# Patient Record
Sex: FEMALE | Race: WHITE | NOT HISPANIC OR LATINO | Employment: UNEMPLOYED | URBAN - METROPOLITAN AREA
[De-identification: names, ages, dates, MRNs, and addresses within clinical notes are randomized per-mention and may not be internally consistent; named-entity substitution may affect disease eponyms.]

---

## 2019-06-18 ENCOUNTER — OFFICE VISIT (OUTPATIENT)
Dept: NEUROSURGERY | Facility: CLINIC | Age: 64
End: 2019-06-18

## 2019-06-18 ENCOUNTER — PREP FOR SURGERY (OUTPATIENT)
Dept: OTHER | Facility: HOSPITAL | Age: 64
End: 2019-06-18

## 2019-06-18 VITALS
BODY MASS INDEX: 30.83 KG/M2 | SYSTOLIC BLOOD PRESSURE: 162 MMHG | TEMPERATURE: 98.7 F | HEIGHT: 63 IN | WEIGHT: 174 LBS | DIASTOLIC BLOOD PRESSURE: 102 MMHG

## 2019-06-18 DIAGNOSIS — D49.6 BRAIN TUMOR (HCC): Primary | ICD-10-CM

## 2019-06-18 DIAGNOSIS — Z85.038 HISTORY OF COLON CANCER: ICD-10-CM

## 2019-06-18 PROBLEM — D32.9 MENINGIOMA (HCC): Status: ACTIVE | Noted: 2019-06-18

## 2019-06-18 PROCEDURE — 99203 OFFICE O/P NEW LOW 30 MIN: CPT | Performed by: NEUROLOGICAL SURGERY

## 2019-06-18 RX ORDER — SODIUM CHLORIDE, SODIUM LACTATE, POTASSIUM CHLORIDE, CALCIUM CHLORIDE 600; 310; 30; 20 MG/100ML; MG/100ML; MG/100ML; MG/100ML
100 INJECTION, SOLUTION INTRAVENOUS CONTINUOUS
Status: CANCELLED | OUTPATIENT
Start: 2019-06-18

## 2019-06-18 RX ORDER — DEXAMETHASONE 4 MG/1
4 TABLET ORAL 2 TIMES DAILY WITH MEALS
Qty: 60 TABLET | Refills: 0 | Status: SHIPPED | OUTPATIENT
Start: 2019-06-18 | End: 2019-07-22

## 2019-06-18 RX ORDER — CEFAZOLIN SODIUM 2 G/100ML
2 INJECTION, SOLUTION INTRAVENOUS ONCE
Status: CANCELLED | OUTPATIENT
Start: 2019-06-18 | End: 2019-06-18

## 2019-06-18 RX ORDER — SODIUM CHLORIDE 0.9 % (FLUSH) 0.9 %
3 SYRINGE (ML) INJECTION EVERY 12 HOURS SCHEDULED
Status: CANCELLED | OUTPATIENT
Start: 2019-06-18

## 2019-06-18 RX ORDER — SODIUM CHLORIDE 0.9 % (FLUSH) 0.9 %
3-10 SYRINGE (ML) INJECTION AS NEEDED
Status: CANCELLED | OUTPATIENT
Start: 2019-06-18

## 2019-06-18 NOTE — PATIENT INSTRUCTIONS
If you need:     Call Dr. Birch on a Monday or Tuesday.      Ask for Rosalba () and leave a message for  Dr. Birch.   He will call you back at the end of the day as soon as he can.     [phone]

## 2019-06-18 NOTE — H&P
Chief Complaint   Patient presents with   • Headache         HISTORY OF PRESENT ILLNESS: This is a 64-year-old female who is referred with a chief complaint of headache.  In February 2019 she was diagnosed and underwent surgical intervention for colon cancer.  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  This appears to be dural based minimal edema.  There is no hydrocephalus or shift of the fourth ventricle.     Her headache is somewhat ill-defined and not that usually associated with increased intracranial pressure.  She attributes this to a high school athletic injury in which she was involved.  Recent symptoms of injury, however.  She has no symptoms of dysmetria or ataxia.  She has no other issues of neurological dysfunction although she has noted that she has issues with her memory more recently subsequent to the stress under which she is living.     Medical History        Past Medical History:   Diagnosis Date   • Anemia     • Anemia     • Arthritis     • Bronchitis     • Cancer (CMS/HCC)       colon   • H/O bladder infections     • History of transfusion     • Low back pain     • Maternal toxemia, unspecified trimester     • Pneumonia     • Transfusion history              Surgical History         Past Surgical History:   Procedure Laterality Date   • CHOLECYSTECTOMY       • COLON RESECTION                      Family History   Problem Relation Age of Onset   • Heart disease Mother     • Heart disease Father     • Cancer Sister           Social History   Social History            Socioeconomic History   • Marital status:        Spouse name: Not on file   • Number of children: Not on file   • Years of education: Not on file   • Highest education level: Not on file   Tobacco Use   • Smoking status: Never Smoker   • Smokeless tobacco: Never Used   Substance and Sexual Activity   •  Alcohol use: Yes   • Drug use: No   • Sexual activity: Defer            No Known Allergies     No current outpatient medications on file.     Review of Systems   Constitutional: Positive for activity change and fatigue. Negative for appetite change, chills, diaphoresis, fever and unexpected weight change.   HENT: Positive for congestion, ear pain and tinnitus. Negative for dental problem, drooling, ear discharge, facial swelling, hearing loss, mouth sores, nosebleeds, postnasal drip, rhinorrhea, sinus pressure, sneezing, sore throat, trouble swallowing and voice change.    Eyes: Positive for pain and visual disturbance. Negative for photophobia, discharge, redness and itching.   Respiratory: Negative for apnea, cough, choking, chest tightness, shortness of breath, wheezing and stridor.    Cardiovascular: Negative for chest pain, palpitations and leg swelling.   Gastrointestinal: Positive for constipation. Negative for abdominal distention, abdominal pain, anal bleeding, blood in stool, diarrhea, nausea, rectal pain and vomiting.   Endocrine: Negative for cold intolerance, heat intolerance, polydipsia, polyphagia and polyuria.   Genitourinary: Negative for decreased urine volume, difficulty urinating, dysuria, enuresis, flank pain, frequency, genital sores, hematuria and urgency.   Musculoskeletal: Positive for arthralgias, back pain and neck pain. Negative for gait problem, joint swelling, myalgias and neck stiffness.   Skin: Negative for color change, pallor, rash and wound.   Allergic/Immunologic: Positive for environmental allergies. Negative for food allergies and immunocompromised state.   Neurological: Positive for headaches. Negative for dizziness, tremors, seizures, syncope, facial asymmetry, speech difficulty, weakness, light-headedness and numbness.   Hematological: Negative for adenopathy. Does not bruise/bleed easily.   Psychiatric/Behavioral: Negative for agitation, behavioral problems, confusion,  "decreased concentration, dysphoric mood, hallucinations, self-injury, sleep disturbance and suicidal ideas. The patient is not nervous/anxious and is not hyperactive.          Vitals       Vitals:     06/18/19 0850   BP: (!) 162/102   BP Location: Left arm   Patient Position: Sitting   Cuff Size: Adult   Temp: 98.7 °F (37.1 °C)   TempSrc: Temporal   Weight: 78.9 kg (174 lb)   Height: 160 cm (63\")            Neurological Examination:     Mental status/speech: The patient is alert and oriented.  Speech is clear without aphysia or dysarthria.  No overt cognitive deficits.     Cranial nerve examination:     Olfaction: Smell is intact.  Vision: Vision is intact without visual field abnormalities.  Funduscopic examination is normal.  No pupillary irregularity.  Ocular motor examination: The extraocular muscles are intact.  There is no diplopia.  The pupil is round and reactive to both light and accommodation.  There is no nystagmus.  Facial movement/sensation: There is no facial weakness.  Sensation is intact in the first, second, and third divisions of the trigeminal nerve.  The corneal reflex is intact.  Auditory: Hearing is intact to finger rub bilaterally.  Cranial nerves IX, X, XI, XII: Phonation is normal.  No dysphagia.  Tongue is protruded in the midline without atrophy.  The gag reflex is intact.  Shoulder shrug is normal.     Musculoligamentous ligamentous examination: No weakness, sensory loss or reflex asymmetry.  No ataxia or dysmetria.  Gait normal.  No Babinski Macario or clonus     Medical Decision Making:                 Diagnostic Data Set: The MRI shows a dural based tumor in the posterior fossa in the mid right cerebellar hemisphere.  This appears to be adjacent to if not attached to the lateral sinus.                             Assessment: Cerebellar neoplasm                                                                            Recommendations: It is impossible to determine whether this is a " benign lesion such as a meningioma or indeed a metastatic disease.  In any case I have recommended removal for histopathological confirmation as well as extraction.  I reviewed all the risks and benefits.  We will do cerebral angiography prior to her surgical intervention for cessation as well as determination of the patency of the lateral sinus.  Craniotomy will be Stealth guided as well.

## 2019-06-23 ENCOUNTER — HOSPITAL ENCOUNTER (OUTPATIENT)
Dept: GENERAL RADIOLOGY | Facility: HOSPITAL | Age: 64
Discharge: HOME | End: 2019-06-23
Admitting: ANESTHESIOLOGY

## 2019-06-23 ENCOUNTER — APPOINTMENT (OUTPATIENT)
Dept: PREADMISSION TESTING | Facility: HOSPITAL | Age: 64
End: 2019-06-23

## 2019-06-23 VITALS — HEIGHT: 63 IN | BODY MASS INDEX: 30.31 KG/M2 | WEIGHT: 171.08 LBS

## 2019-06-23 DIAGNOSIS — D49.6 BRAIN TUMOR (HCC): ICD-10-CM

## 2019-06-23 LAB
ABO GROUP BLD: NORMAL
ANION GAP SERPL CALCULATED.3IONS-SCNC: 16 MMOL/L
BLD GP AB SCN SERPL QL: NEGATIVE
BUN BLD-MCNC: 20 MG/DL (ref 8–23)
BUN/CREAT SERPL: 34.5 (ref 7–25)
CALCIUM SPEC-SCNC: 9.9 MG/DL (ref 8.6–10.5)
CHLORIDE SERPL-SCNC: 102 MMOL/L (ref 98–107)
CO2 SERPL-SCNC: 23 MMOL/L (ref 22–29)
CREAT BLD-MCNC: 0.58 MG/DL (ref 0.57–1)
DEPRECATED RDW RBC AUTO: 41.9 FL (ref 37–54)
ERYTHROCYTE [DISTWIDTH] IN BLOOD BY AUTOMATED COUNT: 14.2 % (ref 12.3–15.4)
GFR SERPL CREATININE-BSD FRML MDRD: 105 ML/MIN/1.73
GLUCOSE BLD-MCNC: 122 MG/DL (ref 65–99)
HBA1C MFR BLD: 5 % (ref 4.8–5.6)
HCT VFR BLD AUTO: 49.3 % (ref 34–46.6)
HGB BLD-MCNC: 16 G/DL (ref 12–15.9)
MCH RBC QN AUTO: 28.1 PG (ref 26.6–33)
MCHC RBC AUTO-ENTMCNC: 32.5 G/DL (ref 31.5–35.7)
MCV RBC AUTO: 86.5 FL (ref 79–97)
MRSA DNA SPEC QL NAA+PROBE: NEGATIVE
PLATELET # BLD AUTO: 282 10*3/MM3 (ref 140–450)
PMV BLD AUTO: 9.8 FL (ref 6–12)
POTASSIUM BLD-SCNC: 4.5 MMOL/L (ref 3.5–5.2)
RBC # BLD AUTO: 5.7 10*6/MM3 (ref 3.77–5.28)
RH BLD: NEGATIVE
SODIUM BLD-SCNC: 141 MMOL/L (ref 136–145)
T&S EXPIRATION DATE: NORMAL
WBC NRBC COR # BLD: 7.02 10*3/MM3 (ref 3.4–10.8)

## 2019-06-23 PROCEDURE — 93005 ELECTROCARDIOGRAM TRACING: CPT

## 2019-06-23 PROCEDURE — 86850 RBC ANTIBODY SCREEN: CPT | Performed by: NEUROLOGICAL SURGERY

## 2019-06-23 PROCEDURE — 86900 BLOOD TYPING SEROLOGIC ABO: CPT | Performed by: NEUROLOGICAL SURGERY

## 2019-06-23 PROCEDURE — 71046 X-RAY EXAM CHEST 2 VIEWS: CPT

## 2019-06-23 PROCEDURE — 83036 HEMOGLOBIN GLYCOSYLATED A1C: CPT | Performed by: NEUROLOGICAL SURGERY

## 2019-06-23 PROCEDURE — 87641 MR-STAPH DNA AMP PROBE: CPT | Performed by: ANESTHESIOLOGY

## 2019-06-23 PROCEDURE — 36415 COLL VENOUS BLD VENIPUNCTURE: CPT

## 2019-06-23 PROCEDURE — 86901 BLOOD TYPING SEROLOGIC RH(D): CPT | Performed by: NEUROLOGICAL SURGERY

## 2019-06-23 PROCEDURE — 93010 ELECTROCARDIOGRAM REPORT: CPT | Performed by: INTERNAL MEDICINE

## 2019-06-23 PROCEDURE — 85027 COMPLETE CBC AUTOMATED: CPT | Performed by: NEUROLOGICAL SURGERY

## 2019-06-23 PROCEDURE — 80048 BASIC METABOLIC PNL TOTAL CA: CPT | Performed by: NEUROLOGICAL SURGERY

## 2019-06-25 ENCOUNTER — HOSPITAL ENCOUNTER (INPATIENT)
Facility: HOSPITAL | Age: 64
LOS: 1 days | Discharge: HOME | End: 2019-06-26
Attending: RADIOLOGY | Admitting: RADIOLOGY

## 2019-06-25 DIAGNOSIS — D49.6 BRAIN TUMOR (HCC): ICD-10-CM

## 2019-06-25 DIAGNOSIS — Z85.038 HISTORY OF COLON CANCER: ICD-10-CM

## 2019-06-25 PROBLEM — I77.71 INTERNAL CAROTID ARTERY DISSECTION (HCC): Status: ACTIVE | Noted: 2019-06-25

## 2019-06-25 PROCEDURE — C1894 INTRO/SHEATH, NON-LASER: HCPCS | Performed by: RADIOLOGY

## 2019-06-25 PROCEDURE — 36224 PLACE CATH CAROTD ART: CPT | Performed by: RADIOLOGY

## 2019-06-25 PROCEDURE — C1760 CLOSURE DEV, VASC: HCPCS | Performed by: RADIOLOGY

## 2019-06-25 PROCEDURE — B319YZZ FLUOROSCOPY OF RIGHT EXTERNAL CAROTID ARTERY USING OTHER CONTRAST: ICD-10-PCS | Performed by: RADIOLOGY

## 2019-06-25 PROCEDURE — C1769 GUIDE WIRE: HCPCS | Performed by: RADIOLOGY

## 2019-06-25 PROCEDURE — C1887 CATHETER, GUIDING: HCPCS | Performed by: RADIOLOGY

## 2019-06-25 PROCEDURE — B316YZZ FLUOROSCOPY OF RIGHT INTERNAL CAROTID ARTERY USING OTHER CONTRAST: ICD-10-PCS | Performed by: RADIOLOGY

## 2019-06-25 PROCEDURE — 25010000002 EPTIFIBATIDE 20 MG/10ML SOLUTION: Performed by: RADIOLOGY

## 2019-06-25 PROCEDURE — 25010000002 HEPARIN (PORCINE) PER 1000 UNITS: Performed by: RADIOLOGY

## 2019-06-25 PROCEDURE — 36227 PLACE CATH XTRNL CAROTID: CPT | Performed by: RADIOLOGY

## 2019-06-25 PROCEDURE — C1876 STENT, NON-COA/NON-COV W/DEL: HCPCS | Performed by: RADIOLOGY

## 2019-06-25 PROCEDURE — 25010000002 MIDAZOLAM PER 1 MG: Performed by: RADIOLOGY

## 2019-06-25 PROCEDURE — 3E033PZ INTRODUCTION OF PLATELET INHIBITOR INTO PERIPHERAL VEIN, PERCUTANEOUS APPROACH: ICD-10-PCS | Performed by: RADIOLOGY

## 2019-06-25 PROCEDURE — 63710000001 DEXAMETHASONE PER 0.25 MG: Performed by: RADIOLOGY

## 2019-06-25 PROCEDURE — 0 IODIXANOL PER 1 ML: Performed by: RADIOLOGY

## 2019-06-25 PROCEDURE — 25010000002 FENTANYL CITRATE (PF) 100 MCG/2ML SOLUTION: Performed by: RADIOLOGY

## 2019-06-25 RX ORDER — DEXAMETHASONE 4 MG/1
4 TABLET ORAL 2 TIMES DAILY WITH MEALS
Status: DISCONTINUED | OUTPATIENT
Start: 2019-06-25 | End: 2019-06-26 | Stop reason: HOSPADM

## 2019-06-25 RX ORDER — EPTIFIBATIDE 2 MG/ML
INJECTION, SOLUTION INTRAVENOUS AS NEEDED
Status: DISCONTINUED | OUTPATIENT
Start: 2019-06-25 | End: 2019-06-25 | Stop reason: HOSPADM

## 2019-06-25 RX ORDER — ASPIRIN 81 MG/1
81 TABLET, CHEWABLE ORAL DAILY
Status: DISCONTINUED | OUTPATIENT
Start: 2019-06-26 | End: 2019-06-26 | Stop reason: HOSPADM

## 2019-06-25 RX ORDER — MIDAZOLAM HYDROCHLORIDE 1 MG/ML
INJECTION INTRAMUSCULAR; INTRAVENOUS AS NEEDED
Status: DISCONTINUED | OUTPATIENT
Start: 2019-06-25 | End: 2019-06-25 | Stop reason: HOSPADM

## 2019-06-25 RX ORDER — SODIUM CHLORIDE 9 MG/ML
75 INJECTION, SOLUTION INTRAVENOUS CONTINUOUS
Status: ACTIVE | OUTPATIENT
Start: 2019-06-25 | End: 2019-06-25

## 2019-06-25 RX ORDER — IODIXANOL 320 MG/ML
INJECTION, SOLUTION INTRAVASCULAR AS NEEDED
Status: DISCONTINUED | OUTPATIENT
Start: 2019-06-25 | End: 2019-06-25 | Stop reason: HOSPADM

## 2019-06-25 RX ORDER — SODIUM CHLORIDE 0.9 % (FLUSH) 0.9 %
3 SYRINGE (ML) INJECTION EVERY 12 HOURS SCHEDULED
Status: DISCONTINUED | OUTPATIENT
Start: 2019-06-25 | End: 2019-06-26 | Stop reason: HOSPADM

## 2019-06-25 RX ORDER — LIDOCAINE HYDROCHLORIDE 10 MG/ML
INJECTION, SOLUTION EPIDURAL; INFILTRATION; INTRACAUDAL; PERINEURAL AS NEEDED
Status: DISCONTINUED | OUTPATIENT
Start: 2019-06-25 | End: 2019-06-25 | Stop reason: HOSPADM

## 2019-06-25 RX ORDER — SODIUM CHLORIDE 0.9 % (FLUSH) 0.9 %
3-10 SYRINGE (ML) INJECTION AS NEEDED
Status: DISCONTINUED | OUTPATIENT
Start: 2019-06-25 | End: 2019-06-26 | Stop reason: HOSPADM

## 2019-06-25 RX ORDER — ASPIRIN 81 MG/1
TABLET, CHEWABLE ORAL AS NEEDED
Status: DISCONTINUED | OUTPATIENT
Start: 2019-06-25 | End: 2019-06-25 | Stop reason: HOSPADM

## 2019-06-25 RX ORDER — HEPARIN SODIUM 1000 [USP'U]/ML
INJECTION, SOLUTION INTRAVENOUS; SUBCUTANEOUS AS NEEDED
Status: DISCONTINUED | OUTPATIENT
Start: 2019-06-25 | End: 2019-06-25 | Stop reason: HOSPADM

## 2019-06-25 RX ORDER — FENTANYL CITRATE 50 UG/ML
INJECTION, SOLUTION INTRAMUSCULAR; INTRAVENOUS AS NEEDED
Status: DISCONTINUED | OUTPATIENT
Start: 2019-06-25 | End: 2019-06-25 | Stop reason: HOSPADM

## 2019-06-25 RX ADMIN — SODIUM CHLORIDE 75 ML/HR: 9 INJECTION, SOLUTION INTRAVENOUS at 15:47

## 2019-06-25 RX ADMIN — SODIUM CHLORIDE 75 ML/HR: 9 INJECTION, SOLUTION INTRAVENOUS at 07:37

## 2019-06-25 RX ADMIN — SODIUM CHLORIDE, PRESERVATIVE FREE 3 ML: 5 INJECTION INTRAVENOUS at 20:51

## 2019-06-25 RX ADMIN — SODIUM CHLORIDE, PRESERVATIVE FREE 3 ML: 5 INJECTION INTRAVENOUS at 15:47

## 2019-06-25 RX ADMIN — DEXAMETHASONE 4 MG: 4 TABLET ORAL at 18:09

## 2019-06-26 ENCOUNTER — TELEPHONE (OUTPATIENT)
Dept: NEUROSURGERY | Facility: CLINIC | Age: 64
End: 2019-06-26

## 2019-06-26 ENCOUNTER — APPOINTMENT (OUTPATIENT)
Dept: CT IMAGING | Facility: HOSPITAL | Age: 64
End: 2019-06-26

## 2019-06-26 VITALS
HEIGHT: 63 IN | RESPIRATION RATE: 18 BRPM | WEIGHT: 175.93 LBS | TEMPERATURE: 98 F | OXYGEN SATURATION: 98 % | DIASTOLIC BLOOD PRESSURE: 64 MMHG | HEART RATE: 59 BPM | SYSTOLIC BLOOD PRESSURE: 135 MMHG | BODY MASS INDEX: 31.17 KG/M2

## 2019-06-26 DIAGNOSIS — I77.71 DISSECTION OF CAROTID ARTERY (HCC): Primary | ICD-10-CM

## 2019-06-26 PROCEDURE — 63710000001 DEXAMETHASONE PER 0.25 MG: Performed by: RADIOLOGY

## 2019-06-26 PROCEDURE — 0 IOPAMIDOL PER 1 ML: Performed by: NEUROLOGICAL SURGERY

## 2019-06-26 PROCEDURE — 70498 CT ANGIOGRAPHY NECK: CPT

## 2019-06-26 RX ORDER — METHYLPREDNISOLONE 4 MG/1
TABLET ORAL
Qty: 21 TABLET | Refills: 0 | Status: SHIPPED | OUTPATIENT
Start: 2019-06-26 | End: 2019-07-22

## 2019-06-26 RX ORDER — ASPIRIN 81 MG/1
81 TABLET, CHEWABLE ORAL DAILY
Qty: 30 TABLET | Refills: 5 | Status: SHIPPED | OUTPATIENT
Start: 2019-06-27 | End: 2019-06-27

## 2019-06-26 RX ADMIN — DEXAMETHASONE 4 MG: 4 TABLET ORAL at 08:24

## 2019-06-26 RX ADMIN — TICAGRELOR 90 MG: 90 TABLET ORAL at 08:24

## 2019-06-26 RX ADMIN — IOPAMIDOL 75 ML: 755 INJECTION, SOLUTION INTRAVENOUS at 05:37

## 2019-06-26 RX ADMIN — ASPIRIN 81 MG CHEWABLE TABLET 81 MG: 81 TABLET CHEWABLE at 08:24

## 2019-06-27 ENCOUNTER — READMISSION MANAGEMENT (OUTPATIENT)
Dept: CALL CENTER | Facility: HOSPITAL | Age: 64
End: 2019-06-27

## 2019-06-27 RX ORDER — ASPIRIN 81 MG/1
81 TABLET, CHEWABLE ORAL DAILY
Qty: 30 TABLET | Refills: 5 | Status: SHIPPED | OUTPATIENT
Start: 2019-06-27 | End: 2019-11-19

## 2019-06-27 NOTE — OUTREACH NOTE
Prep Survey      Responses   Facility patient discharged from?  [place]   Is patient eligible?  Yes   Discharge diagnosis  Brain tumor  Right internal carotid dissection, iatrogenic   tumor resection will have to be postponed   Does the patient have one of the following disease processes/diagnoses(primary or secondary)?  Other   Does the patient have Home health ordered?  No   Is there a DME ordered?  No   Prep survey completed?  Yes          Mara Rios RN

## 2019-06-28 ENCOUNTER — READMISSION MANAGEMENT (OUTPATIENT)
Dept: CALL CENTER | Facility: HOSPITAL | Age: 64
End: 2019-06-28

## 2019-06-28 NOTE — OUTREACH NOTE
Medical Week 1 Survey      Responses   Facility patient discharged from?  [facility]   Does the patient have one of the following disease processes/diagnoses(primary or secondary)?  Other   Is there a successful TCM telephone encounter documented?  No   Week 1 attempt successful?  Yes   Call start time  1441   Call end time  1449   Discharge diagnosis  Brain tumor  Right internal carotid dissection, iatrogenic   tumor resection will have to be postponed   Meds reviewed with patient/caregiver?  Yes   Is the patient having any side effects they believe may be caused by any medication additions or changes?  No   Does the patient have all medications ordered at discharge?  Yes   Is the patient taking all medications as directed (includes completed medication regime)?  Yes   Does the patient have a primary care provider?   Yes   Does the patient have an appointment with their PCP within 7 days of discharge?  Greater than 7 days   What is preventing the patient from scheduling follow up appointments within 7 days of discharge?  Earlier appointment not available   Nursing Interventions  Verified appointment date/time/provider   Has the patient kept scheduled appointments due by today?  N/A   Has home health visited the patient within 72 hours of discharge?  N/A   Psychosocial issues?  No   Did the patient receive a copy of their discharge instructions?  Yes   Nursing interventions  Reviewed instructions with patient   What is the patient's perception of their health status since discharge?  Improving   Is the patient/caregiver able to teach back signs and symptoms related to disease process for when to call PCP?  Yes   Is the patient/caregiver able to teach back signs and symptoms related to disease process for when to call 911?  Yes   Is the patient/caregiver able to teach back the hierarchy of who to call/visit for symptoms/problems? PCP, Specialist, Home health nurse, Urgent Care, ED, 911  Yes   Additional teach back  comments  She is doing well, incision is healing, she has no issues or questions at this time.  Encouraged NIH.gov for information.   Week 1 call completed?  Yes          Dorina Salas RN

## 2019-07-02 ENCOUNTER — TELEPHONE (OUTPATIENT)
Dept: NEUROSURGERY | Facility: CLINIC | Age: 64
End: 2019-07-02

## 2019-07-02 NOTE — TELEPHONE ENCOUNTER
Please tell her to observe.  If she develops facial droop, unilateral weakness, difficulty talking, visual compromise etc. Those are reasons to present to the ED

## 2019-07-02 NOTE — TELEPHONE ENCOUNTER
Patient just called at 4:04 and she said she went into the kitchen and suddenly developed a head/ache in the occipital region.  She states that it lasted 20-30 seconds, and her head felt hot. She currently has a ice pack on it.  She said that it just feels funny now.

## 2019-07-02 NOTE — TELEPHONE ENCOUNTER
When do we need to start this? Pt has enough Brilinta to do her until her appt on the 22nd    Also- when transitioning from Brilinta to Plavix. Does pt need to take regular dose of Brilinta, plus 300 of Plavix the same day, or 300mg of Plavix only?

## 2019-07-02 NOTE — TELEPHONE ENCOUNTER
I spoke with Dr. Weinstein.   - please have her switch to Plavix 75mg 1x daily.     The day that she transitions from Brilinta to Plavix, she needs to take 300mg of plavix as a loading dose.   - each day after, she only needs 75mg

## 2019-07-02 NOTE — TELEPHONE ENCOUNTER
Provider:  Eyal/Benjy  Caller: patient  Time of call:  2:05   Phone #:  [phone]  Surgery:  No-Right internal Carotid dissection - brain tumor  Surgery Date:  TBD  Last visit:   same  Next visit: 07/22/19    LUZ MARIA:         Reason for call:     Patient was given Brilinta 90 mg in the hospital.  She was given a 30 day supply.      Her insurance will not pay for any refills on this medication.  I did a prior authorization for Brilinta and [employer] has denied this.      Can we change it to something else?    Patient aware that it may be tomorrow before we get back with her.

## 2019-07-02 NOTE — TELEPHONE ENCOUNTER
Patient notified and verbalized understanding. She denies facial drooping, visual disturbances or difficultly with her speech. (She has a little ache behind her right eye.) She states that she is feeling a bit better since she used the ice pack. She had her daughter come by to sit with her.     We discussed her Brilinta, and she is aware that the Insurance won't pay after the first initial 30 days. I told her that I would call in Plavix perCallie MCGHEE.  She's aware that the first dose will be a loading dose.    Medication phone in.  Patient aware.

## 2019-07-05 ENCOUNTER — TELEPHONE (OUTPATIENT)
Dept: NEUROSURGERY | Facility: CLINIC | Age: 64
End: 2019-07-05

## 2019-07-05 RX ORDER — OMEPRAZOLE 40 MG/1
40 CAPSULE, DELAYED RELEASE ORAL DAILY
Qty: 30 CAPSULE | Refills: 6 | Status: SHIPPED | OUTPATIENT
Start: 2019-07-05 | End: 2019-07-22

## 2019-07-05 NOTE — TELEPHONE ENCOUNTER
"Provider:  Eyal  Caller: Ramila  Time of call: 9:21am    Phone #:  [phone]  Surgery:  Crani  Surgery Date: TBD   Last visit:   6/18/19  Next visit: 7/22/19    Reason for call:       Pt left a message stating she is having numbness in both legs and feet and she believes this is from the angiogram. She states she feels unsteady on her feet and is having \"heart issues\". She is taking calcium magnesium and potassium. She tells me the Brilinta is is making her food taste odd.     She is wanting to remove the \"plug\" from her groin?       Please call pt back to discuss.        "

## 2019-07-08 ENCOUNTER — TELEPHONE (OUTPATIENT)
Dept: NEUROSURGERY | Facility: CLINIC | Age: 64
End: 2019-07-08

## 2019-07-08 ENCOUNTER — READMISSION MANAGEMENT (OUTPATIENT)
Dept: CALL CENTER | Facility: HOSPITAL | Age: 64
End: 2019-07-08

## 2019-07-08 RX ORDER — CLOPIDOGREL BISULFATE 75 MG/1
75 TABLET ORAL DAILY
Qty: 30 TABLET | Refills: 2 | Status: SHIPPED | OUTPATIENT
Start: 2019-07-08 | End: 2019-07-22

## 2019-07-08 NOTE — TELEPHONE ENCOUNTER
Per Js in previous encounter.     The day that she transitions from Brilinta to Plavix, she needs to take 300mg of plavix as a loading dose.   - each day after, she only needs 75mg

## 2019-07-08 NOTE — OUTREACH NOTE
Medical Week 2 Survey      Responses   Facility patient discharged from?  [facility]   Does the patient have one of the following disease processes/diagnoses(primary or secondary)?  Other   Week 2 attempt successful?  Yes   Call start time  1252   Discharge diagnosis  Brain tumor  Right internal carotid dissection, iatrogenic   tumor resection will have to be postponed   Call end time  1306   Meds reviewed with patient/caregiver?  Yes   Does the patient have all medications ordered at discharge?  Yes   Is the patient taking all medications as directed (includes completed medication regime)?  No   What is preventing the patient from taking all medications as directed?  Side effects [She thinks the Brilinta was causing her brain to feel weak and her heart felt like she was gong to have a heart attack. Her heart now feels calm. 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 ]   Nursing Interventions  Nurse provided patient education, Advised patient to call provider [Advised not to stop medication without discussing with MD, especailly blood thinner, Brilinta.  She has left a voicemail with her neurosurgeon today regarding her stopping medications.]   Medication comments  Takes Cayene pepper for her heart. She has stopped the Brilinta, Saturday evening was the last dose she took.  She has also stopped the Omprazole, she only took 2 of those.   Does the patient have a primary care provider?   Yes   Has the patient kept scheduled appointments due by today?  N/A   Comments  July 22 with neurosurgeon   What is the patient's perception of their health status since discharge?  Improving   Week 2 Call Completed?  Yes          Saray Flores RN

## 2019-07-08 NOTE — TELEPHONE ENCOUNTER
Patient needs to be on DAPT for 6 weeks... She cannot come off of the DAPT. She has a carotid dissection and is at high risk for stroke...     Call patient ASAP and have her go get Plavix today.

## 2019-07-08 NOTE — TELEPHONE ENCOUNTER
I spoke with patient, after clarification from Kaushal MCGHEE and she will  her Plavix today and start it.  She knows to continue the ASA as well.  She was thankful for the call.

## 2019-07-08 NOTE — TELEPHONE ENCOUNTER
"Provider:  Eyal  Caller: Ramila  Time of call: 113    Phone #:  [phone]  Surgery:  Crani  Surgery Date: TBD   Last visit:   6/18/19  Next visit: 7/22/19       Reason for call:         FYI: pt states she has stopped taking Brillinta and as a results many of her issues have resolved or greatly improved. She is taking several \"natural\" remedies and having some success.   "

## 2019-07-10 NOTE — TELEPHONE ENCOUNTER
OK be sure she is taking ASA... Just let her know that if she doesn't take either she will be suboptimally treated for her carotid dissection

## 2019-07-10 NOTE — TELEPHONE ENCOUNTER
Pt called today letting us know she took her first dose of Plavix today, and all of her symptoms returned including gait unsteadiness, heart pain and right arm pain.     Pt states she will NOT resume taking Plavix, this is poisoning her body. Pt states she is going to let her body relax for a few days.

## 2019-07-10 NOTE — TELEPHONE ENCOUNTER
I explained the importance of being on DAP. Pt does not seem worried. She is willing to take ASA 81mg daily as long as it doesn't cause her any problems.

## 2019-07-22 ENCOUNTER — HOSPITAL ENCOUNTER (OUTPATIENT)
Dept: CT IMAGING | Facility: HOSPITAL | Age: 64
Discharge: HOME | End: 2019-07-22
Admitting: RADIOLOGY

## 2019-07-22 ENCOUNTER — OFFICE VISIT (OUTPATIENT)
Dept: NEUROSURGERY | Facility: CLINIC | Age: 64
End: 2019-07-22

## 2019-07-22 VITALS
BODY MASS INDEX: 31.01 KG/M2 | HEIGHT: 63 IN | DIASTOLIC BLOOD PRESSURE: 100 MMHG | WEIGHT: 175 LBS | SYSTOLIC BLOOD PRESSURE: 128 MMHG | TEMPERATURE: 99.2 F

## 2019-07-22 DIAGNOSIS — I77.71 DISSECTION OF CAROTID ARTERY (HCC): ICD-10-CM

## 2019-07-22 DIAGNOSIS — Z85.038 HISTORY OF COLON CANCER: ICD-10-CM

## 2019-07-22 DIAGNOSIS — D32.9 MENINGIOMA (HCC): Primary | ICD-10-CM

## 2019-07-22 LAB — CREAT BLDA-MCNC: 0.8 MG/DL (ref 0.6–1.3)

## 2019-07-22 PROCEDURE — 99213 OFFICE O/P EST LOW 20 MIN: CPT | Performed by: NEUROLOGICAL SURGERY

## 2019-07-22 PROCEDURE — 82565 ASSAY OF CREATININE: CPT

## 2019-07-22 PROCEDURE — 0 IOPAMIDOL PER 1 ML: Performed by: RADIOLOGY

## 2019-07-22 PROCEDURE — 70498 CT ANGIOGRAPHY NECK: CPT

## 2019-07-22 RX ADMIN — IOPAMIDOL 75 ML: 755 INJECTION, SOLUTION INTRAVENOUS at 13:08

## 2019-07-22 NOTE — PROGRESS NOTES
Ramila Roldan  1955  [id number]                        CHIEF COMPLAINT: Right cerebellar hemisphere meningioma         MEDICAL HISTORY SINCE LAST ENCOUNTER: This 64-year-old female reports for follow-up visit and preoperative visit.  She has been noted to have a meningioma in the right occipital lobe superiorly and at the level of the dura inferiorly.  She underwent angiography to determine dural sinus involvement as well as preoperative embolization.  During this procedure she had an iatrogenic dissection of the right cervical internal carotid artery without sequelae.  She reports today for follow-up visit that shows recannulation and no occlusion.    She has had issues with intermittent chest pain and arrhythmia.  This has not been evaluated.           Past Medical History:   Diagnosis Date   • Anemia    • Anemia    • Arthritis    • Cancer (CMS/HCC)     colon   • H/O bladder infections    • History of transfusion    • Low back pain    • Maternal toxemia, unspecified trimester    • Murmur    • Transfusion history               Past Surgical History:   Procedure Laterality Date   • CEREBRAL ANGIOGRAM      06/25/2019 PER DR. BURLESON.   • CHOLECYSTECTOMY     • COLON RESECTION     • COLONOSCOPY  2019   • ENDOSCOPY     • INTERVENTIONAL RADIOLOGY PROCEDURE Bilateral 6/25/2019    Procedure: Carotid Cerebral Angiogram;  Surgeon: Yanick Burleson MD;  Location: [facility] INVASIVE LOCATION;  Service: Interventional Radiology              Family History   Problem Relation Age of Onset   • Heart disease Mother    • Heart disease Father    • Cancer Sister               Social History     Socioeconomic History   • Marital status:      Spouse name: Not on file   • Number of children: Not on file   • Years of education: Not on file   • Highest education level: Not on file   Tobacco Use   • Smoking status: Never Smoker   • Smokeless tobacco: Never Used   Substance and Sexual Activity   • Alcohol use: Yes      Frequency: Never   • Drug use: No   • Sexual activity: Defer            No Known Allergies           Current Outpatient Medications:   •  aspirin 81 MG chewable tablet, Chew 1 tablet Daily., Disp: 30 tablet, Rfl: 5  •  B Complex-Biotin-FA (MULTI-B COMPLEX PO), Take 8 tablets by mouth 3 (Three) Times a Day., Disp: , Rfl:   No current facility-administered medications for this visit.          Review of Systems   Constitutional: Positive for unexpected weight change. Negative for activity change, appetite change, chills, diaphoresis, fatigue and fever.   HENT: Positive for dental problem and tinnitus. Negative for congestion, drooling, ear discharge, ear pain, facial swelling, hearing loss, mouth sores, nosebleeds, postnasal drip, rhinorrhea, sinus pressure, sinus pain, sneezing, sore throat, trouble swallowing and voice change.    Eyes: Positive for visual disturbance. Negative for photophobia, pain, discharge, redness and itching.   Respiratory: Positive for chest tightness (some). Negative for apnea, cough, choking, shortness of breath, wheezing and stridor.    Cardiovascular: Positive for chest pain (some). Negative for palpitations and leg swelling.   Gastrointestinal: Positive for constipation. Negative for abdominal distention, abdominal pain, anal bleeding, blood in stool, diarrhea, nausea, rectal pain and vomiting.   Endocrine: Positive for cold intolerance (feet). Negative for heat intolerance, polydipsia, polyphagia and polyuria.   Genitourinary: Negative for decreased urine volume, difficulty urinating, dyspareunia, dysuria, enuresis, flank pain, frequency, genital sores, hematuria, menstrual problem, pelvic pain, urgency, vaginal bleeding, vaginal discharge and vaginal pain.   Musculoskeletal: Positive for arthralgias, back pain, myalgias, neck pain and neck stiffness. Negative for gait problem and joint swelling.   Skin: Negative for color change, pallor, rash and wound.   Allergic/Immunologic: Positive  "for environmental allergies and food allergies. Negative for immunocompromised state.   Neurological: Positive for numbness (feet) and headaches. Negative for dizziness, tremors, seizures, syncope, facial asymmetry, speech difficulty, weakness and light-headedness.   Hematological: Negative for adenopathy. Does not bruise/bleed easily.   Psychiatric/Behavioral: Negative for agitation, behavioral problems, confusion, decreased concentration, dysphoric mood, hallucinations, self-injury, sleep disturbance and suicidal ideas. The patient is not nervous/anxious and is not hyperactive.                Vitals:    07/22/19 1515   Weight: 79.4 kg (175 lb)   Height: 160 cm (63\")               EXAMINATION: Neurologic examination normal.  No evidence of ataxia, apraxia or dysarthria.  No weakness sensory loss or reflex asymmetry.            MEDICAL DECISION MAKING: We plan to push forward with excision of the meningioma.  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           ASSESSMENT/DISPOSITION: She will need to have cardiac clearance prior to Stealth guided suboccipital craniectomy and excision of this lesion.  We will schedule and proceed accordingly if all is clear.              I APPRECIATE THE OPPORTUNITY OF THIS REFERRAL. PLEASE CALL IF ANY       QUESTIONS [phone]    Scribed for Angel Birch MD by John Pierce CMA. 7/22/2019 3:34 PM     I have read and concur with the information provided by the scribe.  Angel Birch MD    "

## 2019-07-22 NOTE — PATIENT INSTRUCTIONS
Call Dr. Birch on a Monday or Tuesday with an update.      Ask for Rosalba () and leave a message for  Dr. Birch.   He will call you back at the end of the day as soon as he can.     [phone]

## 2019-07-25 ENCOUNTER — TELEPHONE (OUTPATIENT)
Dept: NEUROSURGERY | Facility: CLINIC | Age: 64
End: 2019-07-25

## 2019-07-25 PROBLEM — Z01.810 ENCOUNTER FOR PRE-OPERATIVE CARDIOVASCULAR CLEARANCE: Status: ACTIVE | Noted: 2019-07-25

## 2019-07-25 NOTE — PROGRESS NOTES
Encounter Date:07/26/2019    Patient ID: Ramila Roldan is a 64 y.o. female who resides in [place], [place].    CC/Reason for visit:  Chest Pain           Problem List Items Addressed This Visit     Encounter for pre-operative cardiovascular clearance - Primary    Current Assessment & Plan     · The patient has no symptoms concerning for decompensated heart failure or acute coronary syndrome.  Her chest pain symptoms, in fact, are very atypical and not consistent with obstructive coronary artery disease which could increase her intraoperative cardiac risk.  She has adequate exercise tolerance her physical activities are adequate to replicate stress of surgery and I do not feel any further cardiac testing is necessary  · Proceed with craniotomy with acceptable cardiovascular risk.         Relevant Orders    ECG 12 Lead               · Proceed with craniotomy with acceptable cardiovascular risk          Ramila Roldan is referred to my office by Dr. Angel Birch for preoperative cardiac risk assessment.  The patient is a 64-year-old female with no cardiac history but long-standing complaints of chest pain symptoms.  These pains occur when she is exposed to WiFi networks.  This has been occurring since 2004.  The pain is relieved when she avoids areas with WiFi or blankets herself with Mylar.  She also gets relief of her chest pain symptoms with cayenne pepper.  Her chest pain symptoms are not exacerbated by exertion.  She reports that she recently unloaded and reloaded a trailer and put a tarp over the trailer without eliciting symptoms of chest discomfort.  She denies orthopnea or PND.  She does have back issues which does limit her from routine aerobic activity.  She denies any recent TIA or stroke symptoms.  The patient underwent general anesthesia with colon surgery in February 2019 and did not experience any complications.    The patient is scheduled to undergo craniotomy for a meningioma.  In preparation,  she underwent a carotid angiogram which was complicated by right internal carotid artery dissection.  This has remained stable on repeat imaging.  The patient had difficulty tolerating Brilinta or clopidogrel due to side effects (nonbleeding side effects) and is presently on aspirin monotherapy.      Review of Systems   Constitution: Positive for malaise/fatigue and weight gain. Negative for weakness.   HENT: Positive for tinnitus.    Eyes: Positive for blurred vision. Negative for vision loss in left eye and vision loss in right eye.   Cardiovascular: Positive for chest pain. Negative for dyspnea on exertion, near-syncope, orthopnea, palpitations, paroxysmal nocturnal dyspnea and syncope.   Endocrine: Positive for heat intolerance.   Musculoskeletal: Positive for arthritis, back pain, myalgias and neck pain.   Gastrointestinal: Positive for change in bowel habit and constipation.   Neurological: Positive for headaches. Negative for brief paralysis, excessive daytime sleepiness, focal weakness, numbness and paresthesias.   Allergic/Immunologic: Positive for environmental allergies.   All other systems reviewed and are negative.      The patient's past medical, social, family history and ROS reviewed in the patient's electronic medical record.    Allergies  Patient has no known allergies.    Outpatient Medications Marked as Taking for the 7/26/19 encounter (Consult) with Brian Pascual IV, MD   Medication Sig Dispense Refill   • aspirin 81 MG chewable tablet Chew 1 tablet Daily. 30 tablet 5   • AZO-CRANBERRY PO Take 1 tablet by mouth Daily.     • B Complex-Biotin-FA (MULTI-B COMPLEX PO) Take 8 tablets by mouth 3 (Three) Times a Day.     • calcium carbonate (OS-MANOHAR) 600 MG tablet Take 600 mg by mouth As Needed.     • COD LIVER OIL PO Take 2 mL by mouth 2 (Two) Times a Day.     • ferrous sulfate 324 (65 Fe) MG tablet delayed-release EC tablet Take 324 mg by mouth 2 (Two) Times a Day With Meals.     •  "melatonin 1 MG tablet Take  by mouth.     • Potassium 99 MG tablet Take 99 mg by mouth Daily.     • thiamine (VITAMIN B-1) 100 MG tablet Take 100 mg by mouth Daily.           Past Medical History:   Diagnosis Date   • Anemia    • Anemia    • Arthritis    • Cancer (CMS/HCC)     colon   • H/O bladder infections    • History of transfusion    • Low back pain    • Maternal toxemia, unspecified trimester    • Murmur    • Transfusion history      Past Surgical History:   Procedure Laterality Date   • CEREBRAL ANGIOGRAM      06/25/2019 PER DR. BURLESON.   • CHOLECYSTECTOMY     • COLON RESECTION     • COLONOSCOPY  2019   • ENDOSCOPY     • INTERVENTIONAL RADIOLOGY PROCEDURE Bilateral 6/25/2019    Procedure: Carotid Cerebral Angiogram;  Surgeon: Yanick Burleson MD;  Location: [facility] INVASIVE LOCATION;  Service: Interventional Radiology     Family History   Problem Relation Age of Onset   • Heart disease Mother    • Heart disease Father    • Cancer Sister      Social History     Tobacco Use   • Smoking status: Never Smoker   • Smokeless tobacco: Never Used   Substance Use Topics   • Alcohol use: Yes     Frequency: Never           Blood pressure 164/94, pulse 97, height 160 cm (63\"), weight 80.7 kg (178 lb).  Body mass index is 31.53 kg/m².  Vitals:    07/26/19 0942   Patient Position: Sitting       Physical Exam   Constitutional: She is oriented to person, place, and time. She appears well-developed and well-nourished.   HENT:   Head: Normocephalic and atraumatic.   Eyes: Pupils are equal, round, and reactive to light. No scleral icterus.   Neck: No JVD present. Carotid bruit is not present. No thyromegaly present.   Cardiovascular: Normal rate and regular rhythm. Exam reveals no gallop.   No murmur heard.  Pulmonary/Chest: Effort normal and breath sounds normal.   Abdominal: Soft. She exhibits no distension. There is no hepatosplenomegaly.   Musculoskeletal: She exhibits no edema.   Neurological: She is alert and oriented " to person, place, and time.   Skin: Skin is warm and dry.   Psychiatric: She has a normal mood and affect. Her behavior is normal.       Data Review:       ECG 12 Lead  Date/Time: 7/26/2019 9:56 AM  Performed by: Brian Pascual IV, MD  Authorized by: Brian Pascual IV, MD   Comparison: compared with previous ECG from 6/23/2019  Similar to previous ECG  Rhythm: sinus rhythm  BPM: 97  Other findings: poor R wave progression    Clinical impression: normal ECG  Comments: Poor R wave progression probable lead placement               Lab Results   Component Value Date    GLUCOSE 122 (H) 06/23/2019    CALCIUM 9.9 06/23/2019     06/23/2019    K 4.5 06/23/2019    CO2 23.0 06/23/2019     06/23/2019    BUN 20 06/23/2019    CREATININE 0.80 07/22/2019    EGFRIFNONA 105 06/23/2019    BCR 34.5 (H) 06/23/2019    ANIONGAP 16.0 06/23/2019     Lab Results   Component Value Date    WBC 7.02 06/23/2019    HGB 16.0 (H) 06/23/2019    HCT 49.3 (H) 06/23/2019    MCV 86.5 06/23/2019     06/23/2019     Lab Results   Component Value Date    HGBA1C 5.00 06/23/2019           HARRIS Pascual MD [facility], [facility]  Interventional and General Cardiology

## 2019-07-25 NOTE — TELEPHONE ENCOUNTER
----- Message from Rosalba Magana sent at 7/24/2019  4:20 PM EDT -----  Contact: [phone]  PATIENT CALLING TO LET YOU KNOW SHE HAS HAD PAIN IN HER KIDNEYS SINCE THE MRI, BUT SHE IS BETTER TODAY.

## 2019-07-26 ENCOUNTER — CONSULT (OUTPATIENT)
Dept: CARDIOLOGY | Facility: CLINIC | Age: 64
End: 2019-07-26

## 2019-07-26 ENCOUNTER — APPOINTMENT (OUTPATIENT)
Dept: CT IMAGING | Facility: HOSPITAL | Age: 64
End: 2019-07-26

## 2019-07-26 ENCOUNTER — TELEPHONE (OUTPATIENT)
Dept: CARDIOLOGY | Facility: CLINIC | Age: 64
End: 2019-07-26

## 2019-07-26 VITALS
WEIGHT: 178 LBS | DIASTOLIC BLOOD PRESSURE: 94 MMHG | HEART RATE: 97 BPM | SYSTOLIC BLOOD PRESSURE: 164 MMHG | BODY MASS INDEX: 31.54 KG/M2 | HEIGHT: 63 IN

## 2019-07-26 DIAGNOSIS — Z01.810 ENCOUNTER FOR PRE-OPERATIVE CARDIOVASCULAR CLEARANCE: Primary | ICD-10-CM

## 2019-07-26 DIAGNOSIS — D32.9 MENINGIOMA (HCC): Primary | ICD-10-CM

## 2019-07-26 DIAGNOSIS — I77.71 DISSECTION OF CAROTID ARTERY (HCC): ICD-10-CM

## 2019-07-26 PROBLEM — R07.89 ATYPICAL CHEST PAIN: Status: ACTIVE | Noted: 2019-07-26

## 2019-07-26 PROCEDURE — 93000 ELECTROCARDIOGRAM COMPLETE: CPT | Performed by: INTERNAL MEDICINE

## 2019-07-26 PROCEDURE — 99243 OFF/OP CNSLTJ NEW/EST LOW 30: CPT | Performed by: INTERNAL MEDICINE

## 2019-07-26 RX ORDER — THIAMINE MONONITRATE (VIT B1) 100 MG
100 TABLET ORAL DAILY
COMMUNITY
End: 2019-08-13

## 2019-07-26 RX ORDER — UREA 10 %
LOTION (ML) TOPICAL NIGHTLY PRN
COMMUNITY
End: 2019-11-19

## 2019-07-26 RX ORDER — FERROUS SULFATE TAB EC 324 MG (65 MG FE EQUIVALENT) 324 (65 FE) MG
324 TABLET DELAYED RESPONSE ORAL
COMMUNITY
End: 2019-08-16 | Stop reason: HOSPADM

## 2019-07-26 RX ORDER — PHENOL 1.4 %
600 AEROSOL, SPRAY (ML) MUCOUS MEMBRANE AS NEEDED
COMMUNITY
End: 2019-08-13

## 2019-07-26 NOTE — ASSESSMENT & PLAN NOTE
· The patient has no symptoms concerning for decompensated heart failure or acute coronary syndrome.  Her chest pain symptoms, in fact, are very atypical and not consistent with obstructive coronary artery disease which could increase her intraoperative cardiac risk.  She has adequate exercise tolerance her physical activities are adequate to replicate stress of surgery and I do not feel any further cardiac testing is necessary  · Proceed with craniotomy with acceptable cardiovascular risk.

## 2019-07-29 ENCOUNTER — PREP FOR SURGERY (OUTPATIENT)
Dept: OTHER | Facility: HOSPITAL | Age: 64
End: 2019-07-29

## 2019-07-29 ENCOUNTER — HOSPITAL ENCOUNTER (OUTPATIENT)
Dept: MRI IMAGING | Facility: HOSPITAL | Age: 64
Discharge: HOME | End: 2019-07-29

## 2019-07-29 DIAGNOSIS — Z85.038 HISTORY OF COLON CANCER: ICD-10-CM

## 2019-07-29 DIAGNOSIS — D32.9 MENINGIOMA (HCC): ICD-10-CM

## 2019-07-29 DIAGNOSIS — D32.9 MENINGIOMA (HCC): Primary | ICD-10-CM

## 2019-07-29 DIAGNOSIS — I77.71 DISSECTION OF CAROTID ARTERY (HCC): ICD-10-CM

## 2019-07-29 RX ORDER — CEFAZOLIN SODIUM 2 G/100ML
2 INJECTION, SOLUTION INTRAVENOUS ONCE
Status: CANCELLED | OUTPATIENT
Start: 2019-07-29 | End: 2019-07-29

## 2019-07-29 RX ORDER — SODIUM CHLORIDE 0.9 % (FLUSH) 0.9 %
3-10 SYRINGE (ML) INJECTION AS NEEDED
Status: CANCELLED | OUTPATIENT
Start: 2019-07-29

## 2019-07-29 RX ORDER — SODIUM CHLORIDE 0.9 % (FLUSH) 0.9 %
3 SYRINGE (ML) INJECTION EVERY 12 HOURS SCHEDULED
Status: CANCELLED | OUTPATIENT
Start: 2019-07-29

## 2019-07-29 RX ORDER — SODIUM CHLORIDE, SODIUM LACTATE, POTASSIUM CHLORIDE, CALCIUM CHLORIDE 600; 310; 30; 20 MG/100ML; MG/100ML; MG/100ML; MG/100ML
100 INJECTION, SOLUTION INTRAVENOUS CONTINUOUS
Status: CANCELLED | OUTPATIENT
Start: 2019-07-29

## 2019-07-29 NOTE — H&P
/18/1955  [id number]                          CHIEF COMPLAINT: Right cerebellar hemisphere meningioma          MEDICAL HISTORY SINCE LAST ENCOUNTER: This 64-year-old female reports for follow-up visit and preoperative visit.  She has been noted to have a meningioma in the right occipital lobe superiorly and at the level of the dura inferiorly.  She underwent angiography to determine dural sinus involvement as well as preoperative embolization.  During this procedure she had an iatrogenic dissection of the right cervical internal carotid artery without sequelae.  She reports today for follow-up visit that shows recannulation and no occlusion.     She has had issues with intermittent chest pain and arrhythmia.  This has not been evaluated.            Medical History        Past Medical History:   Diagnosis Date   • Anemia     • Anemia     • Arthritis     • Cancer (CMS/HCC)       colon   • H/O bladder infections     • History of transfusion     • Low back pain     • Maternal toxemia, unspecified trimester     • Murmur     • Transfusion history                     Surgical History         Past Surgical History:   Procedure Laterality Date   • CEREBRAL ANGIOGRAM         06/25/2019 PER DR. BURLESON.   • CHOLECYSTECTOMY       • COLON RESECTION       • COLONOSCOPY   2019   • ENDOSCOPY       • INTERVENTIONAL RADIOLOGY PROCEDURE Bilateral 6/25/2019     Procedure: Carotid Cerebral Angiogram;  Surgeon: Yanick Burleson MD;  Location: [facility] INVASIVE LOCATION;  Service: Interventional Radiology                         Family History   Problem Relation Age of Onset   • Heart disease Mother     • Heart disease Father     • Cancer Sister                  Social History   Social History            Socioeconomic History   • Marital status:        Spouse name: Not on file   • Number of children: Not on file   • Years of education: Not on file   • Highest education level: Not on file   Tobacco Use   • Smoking status: Never  Smoker   • Smokeless tobacco: Never Used   Substance and Sexual Activity   • Alcohol use: Yes       Frequency: Never   • Drug use: No   • Sexual activity: Defer                 No Known Allergies            Current Outpatient Medications:   •  aspirin 81 MG chewable tablet, Chew 1 tablet Daily., Disp: 30 tablet, Rfl: 5  •  B Complex-Biotin-FA (MULTI-B COMPLEX PO), Take 8 tablets by mouth 3 (Three) Times a Day., Disp: , Rfl:   No current facility-administered medications for this visit.           Review of Systems   Constitutional: Positive for unexpected weight change. Negative for activity change, appetite change, chills, diaphoresis, fatigue and fever.   HENT: Positive for dental problem and tinnitus. Negative for congestion, drooling, ear discharge, ear pain, facial swelling, hearing loss, mouth sores, nosebleeds, postnasal drip, rhinorrhea, sinus pressure, sinus pain, sneezing, sore throat, trouble swallowing and voice change.    Eyes: Positive for visual disturbance. Negative for photophobia, pain, discharge, redness and itching.   Respiratory: Positive for chest tightness (some). Negative for apnea, cough, choking, shortness of breath, wheezing and stridor.    Cardiovascular: Positive for chest pain (some). Negative for palpitations and leg swelling.   Gastrointestinal: Positive for constipation. Negative for abdominal distention, abdominal pain, anal bleeding, blood in stool, diarrhea, nausea, rectal pain and vomiting.   Endocrine: Positive for cold intolerance (feet). Negative for heat intolerance, polydipsia, polyphagia and polyuria.   Genitourinary: Negative for decreased urine volume, difficulty urinating, dyspareunia, dysuria, enuresis, flank pain, frequency, genital sores, hematuria, menstrual problem, pelvic pain, urgency, vaginal bleeding, vaginal discharge and vaginal pain.   Musculoskeletal: Positive for arthralgias, back pain, myalgias, neck pain and neck stiffness. Negative for gait problem and  "joint swelling.   Skin: Negative for color change, pallor, rash and wound.   Allergic/Immunologic: Positive for environmental allergies and food allergies. Negative for immunocompromised state.   Neurological: Positive for numbness (feet) and headaches. Negative for dizziness, tremors, seizures, syncope, facial asymmetry, speech difficulty, weakness and light-headedness.   Hematological: Negative for adenopathy. Does not bruise/bleed easily.   Psychiatric/Behavioral: Negative for agitation, behavioral problems, confusion, decreased concentration, dysphoric mood, hallucinations, self-injury, sleep disturbance and suicidal ideas. The patient is not nervous/anxious and is not hyperactive.                  Vitals       Vitals:     07/22/19 1515   Weight: 79.4 kg (175 lb)   Height: 160 cm (63\")                     EXAMINATION: Neurologic examination normal.  No evidence of ataxia, apraxia or dysarthria.  No weakness sensory loss or reflex asymmetry.              MEDICAL DECISION MAKING: We plan to push forward with excision of the meningioma.  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             ASSESSMENT/DISPOSITION: She will need to have cardiac clearance prior to Stealth guided suboccipital craniectomy and excision of this lesion.  We will schedule and proceed accordingly if all is clear.        Has achieved cardiac clearance.  She is also had angiography and embolization.  "

## 2019-07-31 ENCOUNTER — TELEPHONE (OUTPATIENT)
Dept: NEUROSURGERY | Facility: CLINIC | Age: 64
End: 2019-07-31

## 2019-07-31 DIAGNOSIS — M47.16 SPONDYLOSIS WITH MYELOPATHY, LUMBAR REGION: Primary | ICD-10-CM

## 2019-07-31 NOTE — TELEPHONE ENCOUNTER
----- Message from Rosalba Magana sent at 7/31/2019  2:20 PM EDT -----  Contact: [phone]  PATIENT CALLING TO REPORT ON HER CONDITION. STATES SHE IS HAVING CONSTANT MUSCLE SPASMS AND NOTHING SEEMS TO BE HELPING HER.

## 2019-08-06 ENCOUNTER — APPOINTMENT (OUTPATIENT)
Dept: PREADMISSION TESTING | Facility: HOSPITAL | Age: 64
End: 2019-08-06

## 2019-08-06 ENCOUNTER — APPOINTMENT (OUTPATIENT)
Dept: MRI IMAGING | Facility: HOSPITAL | Age: 64
End: 2019-08-06

## 2019-08-09 ENCOUNTER — TELEPHONE (OUTPATIENT)
Dept: NEUROSURGERY | Facility: CLINIC | Age: 64
End: 2019-08-09

## 2019-08-09 NOTE — TELEPHONE ENCOUNTER
----- Message from Rosalba Magana sent at 8/9/2019  9:02 AM EDT -----  Contact: [phone]  PATIENT CALLING TO SAY SHE IS HAVING MUSCLE SPASMS IN HER BACK AND UP AROUND HER NECK.  HAS BEEN USING ICE.

## 2019-08-13 ENCOUNTER — HOSPITAL ENCOUNTER (OUTPATIENT)
Dept: MRI IMAGING | Facility: HOSPITAL | Age: 64
Discharge: HOME | End: 2019-08-13

## 2019-08-13 ENCOUNTER — HOSPITAL ENCOUNTER (OUTPATIENT)
Dept: MRI IMAGING | Facility: HOSPITAL | Age: 64
Discharge: HOME | End: 2019-08-13
Admitting: NEUROLOGICAL SURGERY

## 2019-08-13 ENCOUNTER — APPOINTMENT (OUTPATIENT)
Dept: PREADMISSION TESTING | Facility: HOSPITAL | Age: 64
End: 2019-08-13

## 2019-08-13 VITALS — BODY MASS INDEX: 31.95 KG/M2 | WEIGHT: 180.34 LBS | HEIGHT: 63 IN

## 2019-08-13 DIAGNOSIS — D32.9 MENINGIOMA (HCC): ICD-10-CM

## 2019-08-13 LAB
ABO GROUP BLD: NORMAL
ANION GAP SERPL CALCULATED.3IONS-SCNC: 13 MMOL/L (ref 5–15)
BLD GP AB SCN SERPL QL: NEGATIVE
BUN BLD-MCNC: 14 MG/DL (ref 8–23)
BUN/CREAT SERPL: 21.9 (ref 7–25)
CALCIUM SPEC-SCNC: 9.4 MG/DL (ref 8.6–10.5)
CHLORIDE SERPL-SCNC: 105 MMOL/L (ref 98–107)
CO2 SERPL-SCNC: 24 MMOL/L (ref 22–29)
CREAT BLD-MCNC: 0.64 MG/DL (ref 0.57–1)
DEPRECATED RDW RBC AUTO: 43.4 FL (ref 37–54)
ERYTHROCYTE [DISTWIDTH] IN BLOOD BY AUTOMATED COUNT: 13.2 % (ref 12.3–15.4)
GFR SERPL CREATININE-BSD FRML MDRD: 93 ML/MIN/1.73
GLUCOSE BLD-MCNC: 96 MG/DL (ref 65–99)
HBA1C MFR BLD: 5.6 % (ref 4.8–5.6)
HCT VFR BLD AUTO: 50.6 % (ref 34–46.6)
HGB BLD-MCNC: 16.3 G/DL (ref 12–15.9)
MCH RBC QN AUTO: 29 PG (ref 26.6–33)
MCHC RBC AUTO-ENTMCNC: 32.2 G/DL (ref 31.5–35.7)
MCV RBC AUTO: 90 FL (ref 79–97)
MRSA DNA SPEC QL NAA+PROBE: NEGATIVE
PLATELET # BLD AUTO: 156 10*3/MM3 (ref 140–450)
PMV BLD AUTO: 10.7 FL (ref 6–12)
POTASSIUM BLD-SCNC: 4.4 MMOL/L (ref 3.5–5.2)
RBC # BLD AUTO: 5.62 10*6/MM3 (ref 3.77–5.28)
RH BLD: NEGATIVE
SODIUM BLD-SCNC: 142 MMOL/L (ref 136–145)
T&S EXPIRATION DATE: NORMAL
WBC NRBC COR # BLD: 6.29 10*3/MM3 (ref 3.4–10.8)

## 2019-08-13 PROCEDURE — 36415 COLL VENOUS BLD VENIPUNCTURE: CPT

## 2019-08-13 PROCEDURE — 86850 RBC ANTIBODY SCREEN: CPT | Performed by: NEUROLOGICAL SURGERY

## 2019-08-13 PROCEDURE — 0 GADOBENATE DIMEGLUMINE 529 MG/ML SOLUTION: Performed by: NEUROLOGICAL SURGERY

## 2019-08-13 PROCEDURE — 83036 HEMOGLOBIN GLYCOSYLATED A1C: CPT | Performed by: ANESTHESIOLOGY

## 2019-08-13 PROCEDURE — 72148 MRI LUMBAR SPINE W/O DYE: CPT

## 2019-08-13 PROCEDURE — 80048 BASIC METABOLIC PNL TOTAL CA: CPT | Performed by: NEUROLOGICAL SURGERY

## 2019-08-13 PROCEDURE — 87641 MR-STAPH DNA AMP PROBE: CPT | Performed by: ANESTHESIOLOGY

## 2019-08-13 PROCEDURE — 70552 MRI BRAIN STEM W/DYE: CPT

## 2019-08-13 PROCEDURE — A9577 INJ MULTIHANCE: HCPCS | Performed by: NEUROLOGICAL SURGERY

## 2019-08-13 PROCEDURE — 86901 BLOOD TYPING SEROLOGIC RH(D): CPT | Performed by: NEUROLOGICAL SURGERY

## 2019-08-13 PROCEDURE — 86900 BLOOD TYPING SEROLOGIC ABO: CPT | Performed by: NEUROLOGICAL SURGERY

## 2019-08-13 PROCEDURE — 85027 COMPLETE CBC AUTOMATED: CPT | Performed by: NEUROLOGICAL SURGERY

## 2019-08-13 RX ADMIN — GADOBENATE DIMEGLUMINE 15 ML: 529 INJECTION, SOLUTION INTRAVENOUS at 11:05

## 2019-08-13 NOTE — PAT
Blood bank bracelet applied to patient during Pre Admission Testing visit.  Patient instructed not to remove from arm until after procedure and they are discharged from the hospital.  Explained to patient that they may shower and get the bracelet wet, but not to immerse under water for longer periods (bathing, swimming, hand dishwashing, etc).  Patient verbalized understanding.    BACTROBAN APPLIED TO EACH NOSTRIL DURING PAT VISIT

## 2019-08-14 ENCOUNTER — ANESTHESIA (OUTPATIENT)
Dept: PERIOP | Facility: HOSPITAL | Age: 64
End: 2019-08-14

## 2019-08-14 ENCOUNTER — HOSPITAL ENCOUNTER (INPATIENT)
Facility: HOSPITAL | Age: 64
LOS: 2 days | Discharge: HOME | End: 2019-08-16
Attending: NEUROLOGICAL SURGERY | Admitting: NEUROLOGICAL SURGERY

## 2019-08-14 ENCOUNTER — ANESTHESIA EVENT (OUTPATIENT)
Dept: PERIOP | Facility: HOSPITAL | Age: 64
End: 2019-08-14

## 2019-08-14 DIAGNOSIS — D32.9 MENINGIOMA (HCC): ICD-10-CM

## 2019-08-14 PROBLEM — D33.2 BRAIN TUMOR (BENIGN) (HCC): Status: ACTIVE | Noted: 2019-08-14

## 2019-08-14 LAB
ANION GAP SERPL CALCULATED.3IONS-SCNC: 15 MMOL/L (ref 5–15)
BUN BLD-MCNC: 12 MG/DL (ref 8–23)
BUN/CREAT SERPL: 14.6 (ref 7–25)
CALCIUM SPEC-SCNC: 8.9 MG/DL (ref 8.6–10.5)
CHLORIDE SERPL-SCNC: 102 MMOL/L (ref 98–107)
CO2 SERPL-SCNC: 23 MMOL/L (ref 22–29)
CREAT BLD-MCNC: 0.82 MG/DL (ref 0.57–1)
GFR SERPL CREATININE-BSD FRML MDRD: 70 ML/MIN/1.73
GLUCOSE BLD-MCNC: 211 MG/DL (ref 65–99)
GLUCOSE BLDC GLUCOMTR-MCNC: 124 MG/DL (ref 70–130)
GLUCOSE BLDC GLUCOMTR-MCNC: 185 MG/DL (ref 70–130)
POTASSIUM BLD-SCNC: 3.6 MMOL/L (ref 3.5–5.2)
SODIUM BLD-SCNC: 140 MMOL/L (ref 136–145)

## 2019-08-14 PROCEDURE — 0NR70JZ REPLACEMENT OF OCCIPITAL BONE WITH SYNTHETIC SUBSTITUTE, OPEN APPROACH: ICD-10-PCS | Performed by: NEUROLOGICAL SURGERY

## 2019-08-14 PROCEDURE — C1713 ANCHOR/SCREW BN/BN,TIS/BN: HCPCS | Performed by: NEUROLOGICAL SURGERY

## 2019-08-14 PROCEDURE — 63710000001 INSULIN LISPRO PROTAMINE-INSULIN LISPRO (75-25) 100 UNIT/ML SUSPENSION 10 ML VIAL: Performed by: PHYSICIAN ASSISTANT

## 2019-08-14 PROCEDURE — 00B10ZZ EXCISION OF CEREBRAL MENINGES, OPEN APPROACH: ICD-10-PCS | Performed by: NEUROLOGICAL SURGERY

## 2019-08-14 PROCEDURE — 25010000002 FUROSEMIDE PER 20 MG: Performed by: NURSE ANESTHETIST, CERTIFIED REGISTERED

## 2019-08-14 PROCEDURE — 00U20KZ SUPPLEMENT DURA MATER WITH NONAUTOLOGOUS TISSUE SUBSTITUTE, OPEN APPROACH: ICD-10-PCS | Performed by: NEUROLOGICAL SURGERY

## 2019-08-14 PROCEDURE — 82962 GLUCOSE BLOOD TEST: CPT

## 2019-08-14 PROCEDURE — 80048 BASIC METABOLIC PNL TOTAL CA: CPT | Performed by: NEUROLOGICAL SURGERY

## 2019-08-14 PROCEDURE — 25010000002 FENTANYL CITRATE (PF) 100 MCG/2ML SOLUTION: Performed by: NURSE ANESTHETIST, CERTIFIED REGISTERED

## 2019-08-14 PROCEDURE — S0260 H&P FOR SURGERY: HCPCS | Performed by: PHYSICIAN ASSISTANT

## 2019-08-14 PROCEDURE — 25010000002 ONDANSETRON PER 1 MG: Performed by: NURSE ANESTHETIST, CERTIFIED REGISTERED

## 2019-08-14 PROCEDURE — 25010000002 ONDANSETRON PER 1 MG: Performed by: NEUROLOGICAL SURGERY

## 2019-08-14 PROCEDURE — 25010000003 CEFAZOLIN IN DEXTROSE 2-4 GM/100ML-% SOLUTION: Performed by: NEUROLOGICAL SURGERY

## 2019-08-14 PROCEDURE — 88331 PATH CONSLTJ SURG 1 BLK 1SPC: CPT | Performed by: PATHOLOGY

## 2019-08-14 PROCEDURE — 25010000002 PROPOFOL 1000 MG/ML EMULSION: Performed by: NURSE ANESTHETIST, CERTIFIED REGISTERED

## 2019-08-14 PROCEDURE — 25010000002 DEXAMETHASONE PER 1 MG: Performed by: NEUROLOGICAL SURGERY

## 2019-08-14 PROCEDURE — 25010000002 DEXAMETHASONE PER 1 MG: Performed by: NURSE ANESTHETIST, CERTIFIED REGISTERED

## 2019-08-14 PROCEDURE — 25010000002 NEOSTIGMINE 10 MG/10ML SOLUTION: Performed by: NURSE ANESTHETIST, CERTIFIED REGISTERED

## 2019-08-14 PROCEDURE — 61510 CRNEC TREPH EXC BRN TUM STTL: CPT | Performed by: NEUROLOGICAL SURGERY

## 2019-08-14 PROCEDURE — 88307 TISSUE EXAM BY PATHOLOGIST: CPT | Performed by: NEUROLOGICAL SURGERY

## 2019-08-14 PROCEDURE — 25010000002 HYDROMORPHONE PER 4 MG: Performed by: NURSE ANESTHETIST, CERTIFIED REGISTERED

## 2019-08-14 PROCEDURE — 63710000001 INSULIN LISPRO (HUMAN) PER 5 UNITS: Performed by: PHYSICIAN ASSISTANT

## 2019-08-14 PROCEDURE — 88360 TUMOR IMMUNOHISTOCHEM/MANUAL: CPT | Performed by: NEUROLOGICAL SURGERY

## 2019-08-14 PROCEDURE — 25010000002 PROPOFOL 10 MG/ML EMULSION: Performed by: NURSE ANESTHETIST, CERTIFIED REGISTERED

## 2019-08-14 DEVICE — PLT MESH CMF MATRXNEURO CONTRL RIGDTI100X100: Type: IMPLANTABLE DEVICE | Site: BRAIN | Status: FUNCTIONAL

## 2019-08-14 DEVICE — SCRW MATRIXNEURO SD TI 4MM: Type: IMPLANTABLE DEVICE | Site: CRANIAL | Status: FUNCTIONAL

## 2019-08-14 DEVICE — DURAGEN® PLUS DURAL REGENERATION MATRIX, 3 IN X 3 IN (7.5 CM X 7.5 CM)
Type: IMPLANTABLE DEVICE | Site: BRAIN | Status: FUNCTIONAL
Brand: DURAGEN® PLUS

## 2019-08-14 DEVICE — SCRW MATRIXNEURO SD TI 3MM: Type: IMPLANTABLE DEVICE | Site: CRANIAL | Status: FUNCTIONAL

## 2019-08-14 DEVICE — PERI-GUARD REPAIR PATCH (PERI-GUARD) IS A BIOLOGIC TISSUE PREPARED FROM BOVINE PERICARDIUM CROSS-LINKED WITH GLUTARALDEHYDE. THE PERICARDIUM IS PROCURED FROM CATTLE ORIGINATING IN THE UNITED STATES. PERI-GUARD IS CHEMICALLY STERILIZED USING ETHANOL AND PROPYLENE OXIDE. PERI-GUARD IS TREATED WITH 1 MOLAR SODIUM HYDROXIDE FOR 60-75 MINUTES AT 20-25C.
Type: IMPLANTABLE DEVICE | Site: BRAIN | Status: FUNCTIONAL
Brand: PERI-GUARD

## 2019-08-14 RX ORDER — ONDANSETRON 2 MG/ML
INJECTION INTRAMUSCULAR; INTRAVENOUS AS NEEDED
Status: DISCONTINUED | OUTPATIENT
Start: 2019-08-14 | End: 2019-08-14 | Stop reason: SURG

## 2019-08-14 RX ORDER — SODIUM CHLORIDE 0.9 % (FLUSH) 0.9 %
3-10 SYRINGE (ML) INJECTION AS NEEDED
Status: DISCONTINUED | OUTPATIENT
Start: 2019-08-14 | End: 2019-08-14 | Stop reason: HOSPADM

## 2019-08-14 RX ORDER — LABETALOL HYDROCHLORIDE 5 MG/ML
5 INJECTION, SOLUTION INTRAVENOUS
Status: DISCONTINUED | OUTPATIENT
Start: 2019-08-14 | End: 2019-08-14 | Stop reason: HOSPADM

## 2019-08-14 RX ORDER — SODIUM CHLORIDE 0.9 % (FLUSH) 0.9 %
3 SYRINGE (ML) INJECTION EVERY 12 HOURS SCHEDULED
Status: DISCONTINUED | OUTPATIENT
Start: 2019-08-14 | End: 2019-08-14 | Stop reason: HOSPADM

## 2019-08-14 RX ORDER — ACETAMINOPHEN 500 MG
500 TABLET ORAL 4 TIMES DAILY
Status: DISCONTINUED | OUTPATIENT
Start: 2019-08-14 | End: 2019-08-16 | Stop reason: HOSPADM

## 2019-08-14 RX ORDER — FAMOTIDINE 20 MG/1
20 TABLET, FILM COATED ORAL
Status: COMPLETED | OUTPATIENT
Start: 2019-08-14 | End: 2019-08-14

## 2019-08-14 RX ORDER — HYDROMORPHONE HYDROCHLORIDE 1 MG/ML
0.5 INJECTION, SOLUTION INTRAMUSCULAR; INTRAVENOUS; SUBCUTANEOUS
Status: DISCONTINUED | OUTPATIENT
Start: 2019-08-14 | End: 2019-08-14 | Stop reason: HOSPADM

## 2019-08-14 RX ORDER — MANNITOL 20 G/100ML
INJECTION, SOLUTION INTRAVENOUS CONTINUOUS PRN
Status: DISCONTINUED | OUTPATIENT
Start: 2019-08-14 | End: 2019-08-14 | Stop reason: SURG

## 2019-08-14 RX ORDER — IPRATROPIUM BROMIDE AND ALBUTEROL SULFATE 2.5; .5 MG/3ML; MG/3ML
3 SOLUTION RESPIRATORY (INHALATION) ONCE AS NEEDED
Status: DISCONTINUED | OUTPATIENT
Start: 2019-08-14 | End: 2019-08-14 | Stop reason: HOSPADM

## 2019-08-14 RX ORDER — NEOSTIGMINE METHYLSULFATE 1 MG/ML
INJECTION, SOLUTION INTRAVENOUS AS NEEDED
Status: DISCONTINUED | OUTPATIENT
Start: 2019-08-14 | End: 2019-08-14 | Stop reason: SURG

## 2019-08-14 RX ORDER — PROMETHAZINE HYDROCHLORIDE 25 MG/ML
6.25 INJECTION, SOLUTION INTRAMUSCULAR; INTRAVENOUS ONCE AS NEEDED
Status: DISCONTINUED | OUTPATIENT
Start: 2019-08-14 | End: 2019-08-14 | Stop reason: HOSPADM

## 2019-08-14 RX ORDER — MAGNESIUM HYDROXIDE 1200 MG/15ML
LIQUID ORAL AS NEEDED
Status: DISCONTINUED | OUTPATIENT
Start: 2019-08-14 | End: 2019-08-14 | Stop reason: HOSPADM

## 2019-08-14 RX ORDER — OXYCODONE AND ACETAMINOPHEN 7.5; 325 MG/1; MG/1
2 TABLET ORAL EVERY 4 HOURS PRN
Status: DISCONTINUED | OUTPATIENT
Start: 2019-08-14 | End: 2019-08-16 | Stop reason: HOSPADM

## 2019-08-14 RX ORDER — SODIUM CHLORIDE 9 MG/ML
INJECTION, SOLUTION INTRAVENOUS AS NEEDED
Status: DISCONTINUED | OUTPATIENT
Start: 2019-08-14 | End: 2019-08-14 | Stop reason: HOSPADM

## 2019-08-14 RX ORDER — CEFAZOLIN SODIUM 2 G/100ML
2 INJECTION, SOLUTION INTRAVENOUS ONCE
Status: COMPLETED | OUTPATIENT
Start: 2019-08-14 | End: 2019-08-14

## 2019-08-14 RX ORDER — ROCURONIUM BROMIDE 10 MG/ML
INJECTION, SOLUTION INTRAVENOUS AS NEEDED
Status: DISCONTINUED | OUTPATIENT
Start: 2019-08-14 | End: 2019-08-14 | Stop reason: SURG

## 2019-08-14 RX ORDER — PROMETHAZINE HYDROCHLORIDE 25 MG/ML
12.5 INJECTION, SOLUTION INTRAMUSCULAR; INTRAVENOUS EVERY 6 HOURS PRN
Status: DISCONTINUED | OUTPATIENT
Start: 2019-08-14 | End: 2019-08-16 | Stop reason: HOSPADM

## 2019-08-14 RX ORDER — HYDRALAZINE HYDROCHLORIDE 20 MG/ML
5 INJECTION INTRAMUSCULAR; INTRAVENOUS
Status: DISCONTINUED | OUTPATIENT
Start: 2019-08-14 | End: 2019-08-14 | Stop reason: HOSPADM

## 2019-08-14 RX ORDER — NALOXONE HCL 0.4 MG/ML
0.4 VIAL (ML) INJECTION
Status: DISCONTINUED | OUTPATIENT
Start: 2019-08-14 | End: 2019-08-15

## 2019-08-14 RX ORDER — MEPERIDINE HYDROCHLORIDE 25 MG/ML
12.5 INJECTION INTRAMUSCULAR; INTRAVENOUS; SUBCUTANEOUS
Status: DISCONTINUED | OUTPATIENT
Start: 2019-08-14 | End: 2019-08-14 | Stop reason: HOSPADM

## 2019-08-14 RX ORDER — HYDROMORPHONE HYDROCHLORIDE 1 MG/ML
0.5 INJECTION, SOLUTION INTRAMUSCULAR; INTRAVENOUS; SUBCUTANEOUS
Status: DISCONTINUED | OUTPATIENT
Start: 2019-08-14 | End: 2019-08-15

## 2019-08-14 RX ORDER — PROMETHAZINE HYDROCHLORIDE 25 MG/1
25 SUPPOSITORY RECTAL ONCE AS NEEDED
Status: DISCONTINUED | OUTPATIENT
Start: 2019-08-14 | End: 2019-08-14 | Stop reason: HOSPADM

## 2019-08-14 RX ORDER — FUROSEMIDE 10 MG/ML
INJECTION INTRAMUSCULAR; INTRAVENOUS AS NEEDED
Status: DISCONTINUED | OUTPATIENT
Start: 2019-08-14 | End: 2019-08-14 | Stop reason: SURG

## 2019-08-14 RX ORDER — SODIUM CHLORIDE 0.9 % (FLUSH) 0.9 %
1-10 SYRINGE (ML) INJECTION AS NEEDED
Status: DISCONTINUED | OUTPATIENT
Start: 2019-08-14 | End: 2019-08-14 | Stop reason: HOSPADM

## 2019-08-14 RX ORDER — LIDOCAINE HYDROCHLORIDE 20 MG/ML
INJECTION, SOLUTION INFILTRATION; PERINEURAL AS NEEDED
Status: DISCONTINUED | OUTPATIENT
Start: 2019-08-14 | End: 2019-08-14 | Stop reason: SURG

## 2019-08-14 RX ORDER — SODIUM CHLORIDE, SODIUM LACTATE, POTASSIUM CHLORIDE, CALCIUM CHLORIDE 600; 310; 30; 20 MG/100ML; MG/100ML; MG/100ML; MG/100ML
9 INJECTION, SOLUTION INTRAVENOUS CONTINUOUS
Status: DISCONTINUED | OUTPATIENT
Start: 2019-08-14 | End: 2019-08-16 | Stop reason: HOSPADM

## 2019-08-14 RX ORDER — PROPOFOL 10 MG/ML
VIAL (ML) INTRAVENOUS AS NEEDED
Status: DISCONTINUED | OUTPATIENT
Start: 2019-08-14 | End: 2019-08-14 | Stop reason: SURG

## 2019-08-14 RX ORDER — ONDANSETRON 4 MG/1
4 TABLET, FILM COATED ORAL EVERY 6 HOURS PRN
Status: DISCONTINUED | OUTPATIENT
Start: 2019-08-14 | End: 2019-08-16 | Stop reason: HOSPADM

## 2019-08-14 RX ORDER — PROMETHAZINE HYDROCHLORIDE 25 MG/1
25 TABLET ORAL ONCE AS NEEDED
Status: DISCONTINUED | OUTPATIENT
Start: 2019-08-14 | End: 2019-08-14 | Stop reason: HOSPADM

## 2019-08-14 RX ORDER — GLYCOPYRROLATE 0.2 MG/ML
INJECTION INTRAMUSCULAR; INTRAVENOUS AS NEEDED
Status: DISCONTINUED | OUTPATIENT
Start: 2019-08-14 | End: 2019-08-14 | Stop reason: SURG

## 2019-08-14 RX ORDER — NALOXONE HCL 0.4 MG/ML
0.4 VIAL (ML) INJECTION AS NEEDED
Status: DISCONTINUED | OUTPATIENT
Start: 2019-08-14 | End: 2019-08-14 | Stop reason: HOSPADM

## 2019-08-14 RX ORDER — LIDOCAINE HYDROCHLORIDE 10 MG/ML
0.5 INJECTION, SOLUTION EPIDURAL; INFILTRATION; INTRACAUDAL; PERINEURAL ONCE AS NEEDED
Status: DISCONTINUED | OUTPATIENT
Start: 2019-08-14 | End: 2019-08-14 | Stop reason: HOSPADM

## 2019-08-14 RX ORDER — SODIUM CHLORIDE, SODIUM LACTATE, POTASSIUM CHLORIDE, CALCIUM CHLORIDE 600; 310; 30; 20 MG/100ML; MG/100ML; MG/100ML; MG/100ML
100 INJECTION, SOLUTION INTRAVENOUS CONTINUOUS
Status: DISCONTINUED | OUTPATIENT
Start: 2019-08-14 | End: 2019-08-16 | Stop reason: HOSPADM

## 2019-08-14 RX ORDER — SODIUM CHLORIDE AND POTASSIUM CHLORIDE 150; 450 MG/100ML; MG/100ML
100 INJECTION, SOLUTION INTRAVENOUS CONTINUOUS
Status: DISCONTINUED | OUTPATIENT
Start: 2019-08-14 | End: 2019-08-16 | Stop reason: HOSPADM

## 2019-08-14 RX ORDER — LIDOCAINE HYDROCHLORIDE 40 MG/ML
SOLUTION TOPICAL AS NEEDED
Status: DISCONTINUED | OUTPATIENT
Start: 2019-08-14 | End: 2019-08-14 | Stop reason: SURG

## 2019-08-14 RX ORDER — DEXAMETHASONE SODIUM PHOSPHATE 10 MG/ML
INJECTION INTRAMUSCULAR; INTRAVENOUS AS NEEDED
Status: DISCONTINUED | OUTPATIENT
Start: 2019-08-14 | End: 2019-08-14 | Stop reason: SURG

## 2019-08-14 RX ORDER — LIDOCAINE HYDROCHLORIDE 10 MG/ML
0.5 INJECTION, SOLUTION EPIDURAL; INFILTRATION; INTRACAUDAL; PERINEURAL ONCE AS NEEDED
Status: COMPLETED | OUTPATIENT
Start: 2019-08-14 | End: 2019-08-14

## 2019-08-14 RX ORDER — DOCUSATE SODIUM 100 MG/1
100 CAPSULE, LIQUID FILLED ORAL DAILY
Status: DISCONTINUED | OUTPATIENT
Start: 2019-08-14 | End: 2019-08-16 | Stop reason: HOSPADM

## 2019-08-14 RX ORDER — CEFAZOLIN SODIUM 2 G/100ML
2 INJECTION, SOLUTION INTRAVENOUS EVERY 8 HOURS
Status: COMPLETED | OUTPATIENT
Start: 2019-08-14 | End: 2019-08-15

## 2019-08-14 RX ORDER — ONDANSETRON 2 MG/ML
4 INJECTION INTRAMUSCULAR; INTRAVENOUS ONCE AS NEEDED
Status: COMPLETED | OUTPATIENT
Start: 2019-08-14 | End: 2019-08-14

## 2019-08-14 RX ORDER — FAMOTIDINE 20 MG/1
20 TABLET, FILM COATED ORAL ONCE
Status: DISCONTINUED | OUTPATIENT
Start: 2019-08-14 | End: 2019-08-14 | Stop reason: HOSPADM

## 2019-08-14 RX ORDER — HYDROCODONE BITARTRATE AND ACETAMINOPHEN 5; 325 MG/1; MG/1
1 TABLET ORAL ONCE AS NEEDED
Status: DISCONTINUED | OUTPATIENT
Start: 2019-08-14 | End: 2019-08-14 | Stop reason: HOSPADM

## 2019-08-14 RX ORDER — DEXTROSE MONOHYDRATE 25 G/50ML
25 INJECTION, SOLUTION INTRAVENOUS
Status: DISCONTINUED | OUTPATIENT
Start: 2019-08-14 | End: 2019-08-16 | Stop reason: HOSPADM

## 2019-08-14 RX ORDER — FENTANYL CITRATE 50 UG/ML
50 INJECTION, SOLUTION INTRAMUSCULAR; INTRAVENOUS
Status: DISCONTINUED | OUTPATIENT
Start: 2019-08-14 | End: 2019-08-14 | Stop reason: HOSPADM

## 2019-08-14 RX ORDER — SODIUM CHLORIDE, SODIUM LACTATE, POTASSIUM CHLORIDE, CALCIUM CHLORIDE 600; 310; 30; 20 MG/100ML; MG/100ML; MG/100ML; MG/100ML
9 INJECTION, SOLUTION INTRAVENOUS CONTINUOUS PRN
Status: DISCONTINUED | OUTPATIENT
Start: 2019-08-14 | End: 2019-08-16 | Stop reason: HOSPADM

## 2019-08-14 RX ORDER — FENTANYL CITRATE 50 UG/ML
INJECTION, SOLUTION INTRAMUSCULAR; INTRAVENOUS AS NEEDED
Status: DISCONTINUED | OUTPATIENT
Start: 2019-08-14 | End: 2019-08-14 | Stop reason: SURG

## 2019-08-14 RX ORDER — DEXAMETHASONE SODIUM PHOSPHATE 4 MG/ML
4 INJECTION, SOLUTION INTRA-ARTICULAR; INTRALESIONAL; INTRAMUSCULAR; INTRAVENOUS; SOFT TISSUE EVERY 6 HOURS
Status: DISCONTINUED | OUTPATIENT
Start: 2019-08-14 | End: 2019-08-15

## 2019-08-14 RX ORDER — FAMOTIDINE 10 MG/ML
20 INJECTION, SOLUTION INTRAVENOUS ONCE
Status: DISCONTINUED | OUTPATIENT
Start: 2019-08-14 | End: 2019-08-14 | Stop reason: HOSPADM

## 2019-08-14 RX ORDER — NICOTINE POLACRILEX 4 MG
15 LOZENGE BUCCAL
Status: DISCONTINUED | OUTPATIENT
Start: 2019-08-14 | End: 2019-08-16 | Stop reason: HOSPADM

## 2019-08-14 RX ORDER — ONDANSETRON 2 MG/ML
4 INJECTION INTRAMUSCULAR; INTRAVENOUS EVERY 6 HOURS PRN
Status: DISCONTINUED | OUTPATIENT
Start: 2019-08-14 | End: 2019-08-16 | Stop reason: HOSPADM

## 2019-08-14 RX ADMIN — MANNITOL: 20 INJECTION, SOLUTION INTRAVENOUS at 07:10

## 2019-08-14 RX ADMIN — FENTANYL CITRATE 50 MCG: 0.05 INJECTION, SOLUTION INTRAMUSCULAR; INTRAVENOUS at 10:32

## 2019-08-14 RX ADMIN — DEXAMETHASONE SODIUM PHOSPHATE 4 MG: 4 INJECTION, SOLUTION INTRA-ARTICULAR; INTRALESIONAL; INTRAMUSCULAR; INTRAVENOUS; SOFT TISSUE at 20:36

## 2019-08-14 RX ADMIN — NICARDIPINE HYDROCHLORIDE 5 MG/HR: 0.1 INJECTION, SOLUTION INTRAVENOUS at 10:24

## 2019-08-14 RX ADMIN — ACETAMINOPHEN 500 MG: 500 TABLET, FILM COATED ORAL at 20:36

## 2019-08-14 RX ADMIN — SODIUM CHLORIDE, POTASSIUM CHLORIDE, SODIUM LACTATE AND CALCIUM CHLORIDE 100 ML/HR: 600; 310; 30; 20 INJECTION, SOLUTION INTRAVENOUS at 06:13

## 2019-08-14 RX ADMIN — CEFAZOLIN SODIUM 2 G: 2 INJECTION, SOLUTION INTRAVENOUS at 15:39

## 2019-08-14 RX ADMIN — ACETAMINOPHEN 500 MG: 500 TABLET, FILM COATED ORAL at 17:50

## 2019-08-14 RX ADMIN — LIDOCAINE HYDROCHLORIDE 40 MG: 20 INJECTION, SOLUTION INFILTRATION; PERINEURAL at 07:07

## 2019-08-14 RX ADMIN — POTASSIUM CHLORIDE AND SODIUM CHLORIDE 100 ML/HR: 450; 150 INJECTION, SOLUTION INTRAVENOUS at 22:22

## 2019-08-14 RX ADMIN — LIDOCAINE HYDROCHLORIDE 1 EACH: 40 SOLUTION TOPICAL at 07:12

## 2019-08-14 RX ADMIN — DOCUSATE SODIUM 100 MG: 100 CAPSULE, LIQUID FILLED ORAL at 14:27

## 2019-08-14 RX ADMIN — FENTANYL CITRATE 100 MCG: 50 INJECTION, SOLUTION INTRAMUSCULAR; INTRAVENOUS at 07:39

## 2019-08-14 RX ADMIN — ONDANSETRON 4 MG: 2 INJECTION INTRAMUSCULAR; INTRAVENOUS at 10:54

## 2019-08-14 RX ADMIN — FENTANYL CITRATE 50 MCG: 0.05 INJECTION, SOLUTION INTRAMUSCULAR; INTRAVENOUS at 10:45

## 2019-08-14 RX ADMIN — PROPOFOL 170 MG: 10 INJECTION, EMULSION INTRAVENOUS at 07:07

## 2019-08-14 RX ADMIN — ROCURONIUM BROMIDE 10 MG: 10 INJECTION INTRAVENOUS at 08:52

## 2019-08-14 RX ADMIN — DEXAMETHASONE SODIUM PHOSPHATE 4 MG: 4 INJECTION, SOLUTION INTRA-ARTICULAR; INTRALESIONAL; INTRAMUSCULAR; INTRAVENOUS; SOFT TISSUE at 14:27

## 2019-08-14 RX ADMIN — INSULIN LISPRO 10 UNITS: 100 INJECTION, SUSPENSION SUBCUTANEOUS at 17:50

## 2019-08-14 RX ADMIN — ROCURONIUM BROMIDE 10 MG: 10 INJECTION INTRAVENOUS at 08:18

## 2019-08-14 RX ADMIN — FAMOTIDINE 20 MG: 20 TABLET ORAL at 06:15

## 2019-08-14 RX ADMIN — SODIUM CHLORIDE, POTASSIUM CHLORIDE, SODIUM LACTATE AND CALCIUM CHLORIDE 9 ML/HR: 600; 310; 30; 20 INJECTION, SOLUTION INTRAVENOUS at 06:15

## 2019-08-14 RX ADMIN — POTASSIUM CHLORIDE AND SODIUM CHLORIDE 100 ML/HR: 450; 150 INJECTION, SOLUTION INTRAVENOUS at 12:31

## 2019-08-14 RX ADMIN — FUROSEMIDE 20 MG: 10 INJECTION, SOLUTION INTRAMUSCULAR; INTRAVENOUS at 07:14

## 2019-08-14 RX ADMIN — GLYCOPYRROLATE 0.4 MG: 0.2 INJECTION, SOLUTION INTRAMUSCULAR; INTRAVENOUS at 09:46

## 2019-08-14 RX ADMIN — ACETAMINOPHEN 500 MG: 500 TABLET, FILM COATED ORAL at 12:31

## 2019-08-14 RX ADMIN — EPHEDRINE SULFATE 10 MG: 50 INJECTION INTRAMUSCULAR; INTRAVENOUS; SUBCUTANEOUS at 09:46

## 2019-08-14 RX ADMIN — PROPOFOL 25 MCG/KG/MIN: 10 INJECTION, EMULSION INTRAVENOUS at 07:16

## 2019-08-14 RX ADMIN — INSULIN LISPRO 7 UNITS: 100 INJECTION, SOLUTION INTRAVENOUS; SUBCUTANEOUS at 17:51

## 2019-08-14 RX ADMIN — EPHEDRINE SULFATE 10 MG: 50 INJECTION INTRAMUSCULAR; INTRAVENOUS; SUBCUTANEOUS at 09:50

## 2019-08-14 RX ADMIN — NEOSTIGMINE METHYLSULFATE 2.5 MG: 1 INJECTION, SOLUTION INTRAVENOUS at 09:46

## 2019-08-14 RX ADMIN — CEFAZOLIN SODIUM 2 G: 2 INJECTION, SOLUTION INTRAVENOUS at 07:10

## 2019-08-14 RX ADMIN — PROPOFOL 100 MG: 10 INJECTION, EMULSION INTRAVENOUS at 09:38

## 2019-08-14 RX ADMIN — ROCURONIUM BROMIDE 50 MG: 10 INJECTION INTRAVENOUS at 07:07

## 2019-08-14 RX ADMIN — ONDANSETRON 4 MG: 2 INJECTION INTRAMUSCULAR; INTRAVENOUS at 19:27

## 2019-08-14 RX ADMIN — PROPOFOL 30 MG: 10 INJECTION, EMULSION INTRAVENOUS at 07:20

## 2019-08-14 RX ADMIN — PROPOFOL 50 MG: 10 INJECTION, EMULSION INTRAVENOUS at 07:39

## 2019-08-14 RX ADMIN — FENTANYL CITRATE 100 MCG: 50 INJECTION, SOLUTION INTRAMUSCULAR; INTRAVENOUS at 07:07

## 2019-08-14 RX ADMIN — NICARDIPINE HYDROCHLORIDE 5 MG/HR: 0.2 INJECTION, SOLUTION INTRAVENOUS at 16:50

## 2019-08-14 RX ADMIN — OXYCODONE HYDROCHLORIDE AND ACETAMINOPHEN 2 TABLET: 7.5; 325 TABLET ORAL at 14:22

## 2019-08-14 RX ADMIN — HYDROMORPHONE HYDROCHLORIDE 0.5 MG: 1 INJECTION, SOLUTION INTRAMUSCULAR; INTRAVENOUS; SUBCUTANEOUS at 11:24

## 2019-08-14 RX ADMIN — ONDANSETRON 4 MG: 2 INJECTION INTRAMUSCULAR; INTRAVENOUS at 09:38

## 2019-08-14 RX ADMIN — LIDOCAINE HYDROCHLORIDE 0.5 ML: 10 INJECTION, SOLUTION EPIDURAL; INFILTRATION; INTRACAUDAL; PERINEURAL at 06:15

## 2019-08-14 RX ADMIN — DEXAMETHASONE SODIUM PHOSPHATE 10 MG: 10 INJECTION INTRAMUSCULAR; INTRAVENOUS at 07:36

## 2019-08-14 RX ADMIN — SODIUM CHLORIDE, POTASSIUM CHLORIDE, SODIUM LACTATE AND CALCIUM CHLORIDE 9 ML/HR: 600; 310; 30; 20 INJECTION, SOLUTION INTRAVENOUS at 06:17

## 2019-08-14 RX ADMIN — HYDROMORPHONE HYDROCHLORIDE 0.5 MG: 1 INJECTION, SOLUTION INTRAMUSCULAR; INTRAVENOUS; SUBCUTANEOUS at 10:55

## 2019-08-14 NOTE — H&P
CHIEF COMPLAINT: Right cerebellar hemisphere meningioma          MEDICAL HISTORY SINCE LAST ENCOUNTER: This 64-year-old female reports for follow-up visit and preoperative visit.  She has been noted to have a meningioma in the right occipital lobe superiorly and at the level of the dura inferiorly.  She underwent angiography to determine dural sinus involvement as well as preoperative embolization.  During this procedure she had an iatrogenic dissection of the right cervical internal carotid artery without sequelae.  She reports today for follow-up visit that shows recannulation and no occlusion.     She has had issues with intermittent chest pain and arrhythmia.  This has not been evaluated.        Medical History           Past Medical History:   Diagnosis Date   • Anemia     • Anemia     • Arthritis     • Cancer (CMS/HCC)       colon   • H/O bladder infections     • History of transfusion     • Low back pain     • Maternal toxemia, unspecified trimester     • Murmur     • Transfusion history                 Surgical History             Past Surgical History:   Procedure Laterality Date   • CEREBRAL ANGIOGRAM         06/25/2019 PER DR. BURLESON.   • CHOLECYSTECTOMY       • COLON RESECTION       • COLONOSCOPY   2019   • ENDOSCOPY       • INTERVENTIONAL RADIOLOGY PROCEDURE Bilateral 6/25/2019     Procedure: Carotid Cerebral Angiogram;  Surgeon: Yanick Burleson MD;  Location: [facility] INVASIVE LOCATION;  Service: Interventional Radiology                         Family History   Problem Relation Age of Onset   • Heart disease Mother     • Heart disease Father     • Cancer Sister              Social History   Social History                Socioeconomic History   • Marital status:        Spouse name: Not on file   • Number of children: Not on file   • Years of education: Not on file   • Highest education level: Not on file   Tobacco Use   • Smoking status: Never Smoker   • Smokeless tobacco: Never Used    Substance and Sexual Activity   • Alcohol use: Yes       Frequency: Never   • Drug use: No   • Sexual activity: Defer                 No Known Allergies        Current Outpatient Medications:   •  aspirin 81 MG chewable tablet, Chew 1 tablet Daily., Disp: 30 tablet, Rfl: 5  •  B Complex-Biotin-FA (MULTI-B COMPLEX PO), Take 8 tablets by mouth 3 (Three) Times a Day., Disp: , Rfl:   No current facility-administered medications for this visit.           Review of Systems   Constitutional: Positive for unexpected weight change. Negative for activity change, appetite change, chills, diaphoresis, fatigue and fever.   HENT: Positive for dental problem and tinnitus. Negative for congestion, drooling, ear discharge, ear pain, facial swelling, hearing loss, mouth sores, nosebleeds, postnasal drip, rhinorrhea, sinus pressure, sinus pain, sneezing, sore throat, trouble swallowing and voice change.    Eyes: Positive for visual disturbance. Negative for photophobia, pain, discharge, redness and itching.   Respiratory: Positive for chest tightness (some). Negative for apnea, cough, choking, shortness of breath, wheezing and stridor.    Cardiovascular: Positive for chest pain (some). Negative for palpitations and leg swelling.   Gastrointestinal: Positive for constipation. Negative for abdominal distention, abdominal pain, anal bleeding, blood in stool, diarrhea, nausea, rectal pain and vomiting.   Endocrine: Positive for cold intolerance (feet). Negative for heat intolerance, polydipsia, polyphagia and polyuria.   Genitourinary: Negative for decreased urine volume, difficulty urinating, dyspareunia, dysuria, enuresis, flank pain, frequency, genital sores, hematuria, menstrual problem, pelvic pain, urgency, vaginal bleeding, vaginal discharge and vaginal pain.   Musculoskeletal: Positive for arthralgias, back pain, myalgias, neck pain and neck stiffness. Negative for gait problem and joint swelling.   Skin: Negative for color  "change, pallor, rash and wound.   Allergic/Immunologic: Positive for environmental allergies and food allergies. Negative for immunocompromised state.   Neurological: Positive for numbness (feet) and headaches. Negative for dizziness, tremors, seizures, syncope, facial asymmetry, speech difficulty, weakness and light-headedness.   Hematological: Negative for adenopathy. Does not bruise/bleed easily.   Psychiatric/Behavioral: Negative for agitation, behavioral problems, confusion, decreased concentration, dysphoric mood, hallucinations, self-injury, sleep disturbance and suicidal ideas. The patient is not nervous/anxious and is not hyperactive.             Vitals         Vitals:     07/22/19 1515   Weight: 79.4 kg (175 lb)   Height: 160 cm (63\")                     EXAMINATION: Neurologic examination normal.  No evidence of ataxia, apraxia or dysarthria.  No weakness sensory loss or reflex asymmetry.              MEDICAL DECISION MAKING: We plan to push forward with excision of the meningioma.  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      8/14/19  Lungs-clear  COR-RRR,  Abd-soft  Ext- +pulses, -edema  Motor- moves all 4's    Ready for surgery.    Martha Borjas PA-C      "

## 2019-08-14 NOTE — OP NOTE
"Preoperative diagnosis: Right suboccipital meningioma    Postoperative diagnosis:    Procedure performed    1.  Stealth guided suboccipital craniotomy with excision of meningioma.  2.  Dural graft  3.  Cranioplasty    Surgeon: Angel Birch M.D.  Surgeon: Chaparro Hill MD  Assistant: Ana Maria Borjas PA-C    Indications: This is a 64-year-old female admitted for surgical intervention to remove a suboccipital meningioma in the right superior posterior fossa and attached to the sinus.  The risks and benefits of this procedure were discussed with the patient and family.  Consent forms were presented and signatures obtained.  All questions have been answered satisfactorily and the patient/family wishes to proceed with the procedure.    Description of procedure: Subsequent to induction with general anesthesia, a Fontaine was placed into the bladder and the patient was placed in the left lateral position with the head flexed and turned slightly toward her left.  Position with the Vora head rest applied.  The usual preparation and draping were performed.  Subsequent to procedure roll \"timeout\" the operation began.    The coordinates from the fiducials were added into the Stealth station and the extent of the tumor and craniotomy subsequently planned.    Through a linear incision the suboccipital musculature was subperiosteally removed and retracted with self-retaining retracting.  Midas Tommie was used to create a generous suboccipital craniectomy.  This was carried superior to expose the lateral sinus on the right.  Using the Stealth we were able to outline the tumor quite nicely with the craniotomy extending beyond the confines of the neoplasm.    Hemostasis was obtained with FloSeal and without difficulty.  The dura was opened inferiorly and hinged superiorly.  A meningioma was encountered which is quite firm and avascular.  This was removed from its bed within the superior cerebellar hemisphere.  With the use " "of the SonoPec a complete excision was performed.  One remnant of the tumor was within the lateral sinus.  Gelfoam and Surgicel were placed over this and hemostasis was obtained.    The tumor bed was inspected hemostasis obtained.  Surgicel was placed along the bed.      Bovine pericardium was used as a dural graft and was loosely sutured to the dura.  Over this was placed none suturable DuraGen and Surgicel to obtain a watertight closure.    Cranioplasty was then fashioned out of tantalum and was secured with 3 and 4 mm screws.    The wound was then closed in multiple layers of 2 and 3-0 Vicryl and 3-0 nylon for the skin.  A sterile dressing was applied and the patient was taken to recovery in satisfactory condition.    .    Summary of surgical findings: This was a very firm avascular meningioma which was removed with its attachment to the inferior aspect of the transverse sinus.  This was open however was \"sealed\" with Gelfoam and Surgicel.    Complications: None apparent  .  "

## 2019-08-14 NOTE — ANESTHESIA PREPROCEDURE EVALUATION
Anesthesia Evaluation     Patient summary reviewed and Nursing notes reviewed   no history of anesthetic complications:  NPO Solid Status: > 8 hours  NPO Liquid Status: > 8 hours           Airway   Mallampati: II  TM distance: >3 FB  Neck ROM: full  No difficulty expected  Dental      Pulmonary - negative pulmonary ROS and normal exam   Cardiovascular - normal exam    (+) valvular problems/murmurs, PVD,  carotid artery disease      Neuro/Psych- negative ROS  GI/Hepatic/Renal/Endo      Musculoskeletal     Abdominal    Substance History      OB/GYN          Other   (+) arthritis   history of cancer                    Anesthesia Plan    ASA 3     general   (Jia)  intravenous induction   Anesthetic plan, all risks, benefits, and alternatives have been provided, discussed and informed consent has been obtained with: patient.    Plan discussed with CRNA.

## 2019-08-14 NOTE — ANESTHESIA POSTPROCEDURE EVALUATION
Patient: Ramila Roldan    Procedure Summary     Date:  08/14/19 Room / Location:   [facility] /  [facility]    Anesthesia Start:  0659 Anesthesia Stop:  1014    Procedure:  CRANIOTOMY SUBOCCIPITAL WITH STEALTH RIGHT (Right Spine Cervical) Diagnosis:       Meningioma (CMS/HCC)      (Meningioma (CMS/HCC) [D32.9])    Surgeon:  Angel Birch MD Provider:  Jason Tanner MD    Anesthesia Type:  general ASA Status:  3          Anesthesia Type: general  Last vitals  /65   Temp 97.1   Pulse 73   Resp 14   SpO2 99     Post Anesthesia Care and Evaluation    Patient location during evaluation: PACU  Patient participation: complete - patient participated  Level of consciousness: awake and alert  Pain score: 0  Pain management: adequate  Airway patency: patent  Anesthetic complications: No anesthetic complications  PONV Status: none  Cardiovascular status: hemodynamically stable and acceptable  Respiratory status: nonlabored ventilation, acceptable and nasal cannula  Hydration status: acceptable

## 2019-08-14 NOTE — ANESTHESIA PROCEDURE NOTES
Airway  Urgency: elective    Airway not difficult    General Information and Staff    Patient location during procedure: OR  CRNA: Musa Lees III, CRNA    Indications and Patient Condition  Indications for airway management: airway protection    Preoxygenated: yes  MILS not maintained throughout  Mask difficulty assessment: 1 - vent by mask    Final Airway Details  Final airway type: endotracheal airway      Successful airway: ETT  Cuffed: yes   Successful intubation technique: direct laryngoscopy  Endotracheal tube insertion site: oral  Blade: Kj  Blade size: 3  ETT size (mm): 7.0  Cormack-Lehane Classification: grade I - full view of glottis  Placement verified by: chest auscultation and capnometry   Measured from: lips  ETT to lips (cm): 21  Number of attempts at approach: 1    Additional Comments  Negative epigastric sounds, Breath sound equal bilaterally with symmetric chest rise and fall

## 2019-08-14 NOTE — BRIEF OP NOTE
CRANIOTOMY SUBOCCIPITAL WITH STEALTH  Progress Note    Ramila Roldan  8/14/2019    Pre-op Diagnosis:   Meningioma (CMS/HCC) [D32.9]       Post-Op Diagnosis Codes:     * Meningioma (CMS/HCC) [D32.9]    Procedure/CPT® Codes:      Procedure(s):  CRANIOTOMY SUBOCCIPITAL WITH STEALTH RIGHT    Surgeon(s):  Angel Birch MD Kiefer, Steven P, MD    Anesthesia: General    Staff:   Circulator: Diane Moyer RN  Scrub Person: Eve Arriaga  Nursing Assistant: Bull Rayo  Assistant: Martha Borjas PA-C    Estimated Blood Loss: minimal    Urine Voided: 2200 mL    Specimens:                Specimens     ID Source Type Tests Collected By Collected At Frozen?      A Brain Tissue · TISSUE PATHOLOGY EXAM   Angel Birch MD 8/14/19 0820 Yes     Description: Brain tumor Right for frozen    B Brain Aspirate · TISSUE PATHOLOGY EXAM   Angel Birch MD 8/14/19 0823      Description: Sonopet aspirate for permanent    C Brain Tissue · TISSUE PATHOLOGY EXAM   Angel Birch MD 8/14/19 0924      Description: Brain tumor right for permanent                Drains:   Urethral Catheter Silicone 16 Fr. (Active)   Collection Container Standard drainage bag 8/14/2019  7:59 AM   Securement Method Securing device 8/14/2019  7:59 AM   Irrigation Ease no resistance met 8/14/2019  7:59 AM       Findings: Meningioma    Complications: None apparent      Angel Birch MD     Date: 8/14/2019  Time: 9:41 AM

## 2019-08-15 ENCOUNTER — APPOINTMENT (OUTPATIENT)
Dept: CT IMAGING | Facility: HOSPITAL | Age: 64
End: 2019-08-15

## 2019-08-15 LAB
ANION GAP SERPL CALCULATED.3IONS-SCNC: 11 MMOL/L (ref 5–15)
BUN BLD-MCNC: 10 MG/DL (ref 8–23)
BUN/CREAT SERPL: 14.7 (ref 7–25)
CALCIUM SPEC-SCNC: 8.8 MG/DL (ref 8.6–10.5)
CHLORIDE SERPL-SCNC: 105 MMOL/L (ref 98–107)
CO2 SERPL-SCNC: 23 MMOL/L (ref 22–29)
CREAT BLD-MCNC: 0.68 MG/DL (ref 0.57–1)
DEPRECATED RDW RBC AUTO: 43.6 FL (ref 37–54)
ERYTHROCYTE [DISTWIDTH] IN BLOOD BY AUTOMATED COUNT: 13.3 % (ref 12.3–15.4)
GFR SERPL CREATININE-BSD FRML MDRD: 87 ML/MIN/1.73
GLUCOSE BLD-MCNC: 168 MG/DL (ref 65–99)
GLUCOSE BLDC GLUCOMTR-MCNC: 119 MG/DL (ref 70–130)
GLUCOSE BLDC GLUCOMTR-MCNC: 133 MG/DL (ref 70–130)
GLUCOSE BLDC GLUCOMTR-MCNC: 147 MG/DL (ref 70–130)
GLUCOSE BLDC GLUCOMTR-MCNC: 97 MG/DL (ref 70–130)
HCT VFR BLD AUTO: 41.9 % (ref 34–46.6)
HGB BLD-MCNC: 13.9 G/DL (ref 12–15.9)
MCH RBC QN AUTO: 29.5 PG (ref 26.6–33)
MCHC RBC AUTO-ENTMCNC: 33.2 G/DL (ref 31.5–35.7)
MCV RBC AUTO: 89 FL (ref 79–97)
PLATELET # BLD AUTO: 233 10*3/MM3 (ref 140–450)
PMV BLD AUTO: 9.5 FL (ref 6–12)
POTASSIUM BLD-SCNC: 4.2 MMOL/L (ref 3.5–5.2)
RBC # BLD AUTO: 4.71 10*6/MM3 (ref 3.77–5.28)
SODIUM BLD-SCNC: 139 MMOL/L (ref 136–145)
WBC NRBC COR # BLD: 14.29 10*3/MM3 (ref 3.4–10.8)

## 2019-08-15 PROCEDURE — 25010000003 CEFAZOLIN IN DEXTROSE 2-4 GM/100ML-% SOLUTION: Performed by: NEUROLOGICAL SURGERY

## 2019-08-15 PROCEDURE — 25010000002 DEXAMETHASONE PER 1 MG: Performed by: NEUROLOGICAL SURGERY

## 2019-08-15 PROCEDURE — 85027 COMPLETE CBC AUTOMATED: CPT | Performed by: NEUROLOGICAL SURGERY

## 2019-08-15 PROCEDURE — 80048 BASIC METABOLIC PNL TOTAL CA: CPT | Performed by: NEUROLOGICAL SURGERY

## 2019-08-15 PROCEDURE — 70450 CT HEAD/BRAIN W/O DYE: CPT

## 2019-08-15 PROCEDURE — 82962 GLUCOSE BLOOD TEST: CPT

## 2019-08-15 PROCEDURE — 99024 POSTOP FOLLOW-UP VISIT: CPT | Performed by: PHYSICIAN ASSISTANT

## 2019-08-15 RX ORDER — DEXAMETHASONE SODIUM PHOSPHATE 4 MG/ML
2 INJECTION, SOLUTION INTRA-ARTICULAR; INTRALESIONAL; INTRAMUSCULAR; INTRAVENOUS; SOFT TISSUE EVERY 6 HOURS
Status: DISCONTINUED | OUTPATIENT
Start: 2019-08-15 | End: 2019-08-16 | Stop reason: HOSPADM

## 2019-08-15 RX ADMIN — DEXAMETHASONE SODIUM PHOSPHATE 2 MG: 4 INJECTION, SOLUTION INTRA-ARTICULAR; INTRALESIONAL; INTRAMUSCULAR; INTRAVENOUS; SOFT TISSUE at 21:14

## 2019-08-15 RX ADMIN — CEFAZOLIN SODIUM 2 G: 2 INJECTION, SOLUTION INTRAVENOUS at 08:26

## 2019-08-15 RX ADMIN — DOCUSATE SODIUM 100 MG: 100 CAPSULE, LIQUID FILLED ORAL at 10:03

## 2019-08-15 RX ADMIN — ACETAMINOPHEN 500 MG: 500 TABLET, FILM COATED ORAL at 17:29

## 2019-08-15 RX ADMIN — ACETAMINOPHEN 500 MG: 500 TABLET, FILM COATED ORAL at 12:07

## 2019-08-15 RX ADMIN — INSULIN LISPRO 10 UNITS: 100 INJECTION, SUSPENSION SUBCUTANEOUS at 08:28

## 2019-08-15 RX ADMIN — ACETAMINOPHEN 500 MG: 500 TABLET, FILM COATED ORAL at 08:27

## 2019-08-15 RX ADMIN — NICARDIPINE HYDROCHLORIDE 5 MG/HR: 0.2 INJECTION, SOLUTION INTRAVENOUS at 00:25

## 2019-08-15 RX ADMIN — CEFAZOLIN SODIUM 2 G: 2 INJECTION, SOLUTION INTRAVENOUS at 00:25

## 2019-08-15 RX ADMIN — INSULIN LISPRO 10 UNITS: 100 INJECTION, SUSPENSION SUBCUTANEOUS at 17:29

## 2019-08-15 RX ADMIN — OXYCODONE HYDROCHLORIDE AND ACETAMINOPHEN 1 TABLET: 7.5; 325 TABLET ORAL at 16:06

## 2019-08-15 RX ADMIN — DEXAMETHASONE SODIUM PHOSPHATE 2 MG: 4 INJECTION, SOLUTION INTRA-ARTICULAR; INTRALESIONAL; INTRAMUSCULAR; INTRAVENOUS; SOFT TISSUE at 10:03

## 2019-08-15 RX ADMIN — OXYCODONE HYDROCHLORIDE AND ACETAMINOPHEN 2 TABLET: 7.5; 325 TABLET ORAL at 04:03

## 2019-08-15 RX ADMIN — DEXAMETHASONE SODIUM PHOSPHATE 2 MG: 4 INJECTION, SOLUTION INTRA-ARTICULAR; INTRALESIONAL; INTRAMUSCULAR; INTRAVENOUS; SOFT TISSUE at 16:06

## 2019-08-15 RX ADMIN — CEFAZOLIN SODIUM 2 G: 2 INJECTION, SOLUTION INTRAVENOUS at 16:02

## 2019-08-15 RX ADMIN — DEXAMETHASONE SODIUM PHOSPHATE 4 MG: 4 INJECTION, SOLUTION INTRA-ARTICULAR; INTRALESIONAL; INTRAMUSCULAR; INTRAVENOUS; SOFT TISSUE at 02:09

## 2019-08-15 NOTE — PLAN OF CARE
Problem: Patient Care Overview  Goal: Plan of Care Review  Outcome: Ongoing (interventions implemented as appropriate)   08/15/19 0455   Plan of Care Review   Progress improving   OTHER   Outcome Summary Patient VSS over night; pain reported x1 but well controlled with PRN medication; will continue to monitor.       Problem: Skin Injury Risk (Adult)  Goal: Identify Related Risk Factors and Signs and Symptoms  Outcome: Ongoing (interventions implemented as appropriate)   08/15/19 0455   Skin Injury Risk (Adult)   Related Risk Factors (Skin Injury Risk) advanced age;critical care admission;medical devices;medication;mobility impaired     Goal: Skin Health and Integrity  Outcome: Ongoing (interventions implemented as appropriate)   08/15/19 0455   Skin Injury Risk (Adult)   Skin Health and Integrity achieves outcome

## 2019-08-15 NOTE — PROGRESS NOTES
[facility]    Subjective   Ramila Roldan 1955 64 y.o. female     08/15/19    Chief complaint: mild neck/incision discomfort.    HPI  1 Day Post-Op  VSS. Ambulated. Voiding. Taking po without nausea.     Scheduled Meds:  acetaminophen 500 mg Oral 4x Daily   ceFAZolin 2 g Intravenous Q8H   dexamethasone 2 mg Oral Q6H   docusate sodium 100 mg Oral Daily   insulin lispro 0-25 Units Subcutaneous 4x Daily With Meals & Nightly   insulin lispro protamine-insulin lispro 10 Units Subcutaneous BID With Meals       PRN Meds:.Cataplex B *Patient Supplied*  •  dextrose  •  dextrose  •  glucagon (human recombinant)  •  Goldenseal  •  lactated ringers  •  ondansetron **OR** ondansetron  •  oxyCODONE-acetaminophen  •  Pharmacy Consult  •  promethazine    Intake/Output       08/13/19 0701 - 08/14/19 0700 (Not Admitted) 08/14/19 0701 - 08/15/19 0700 08/15/19 0701 - 08/16/19 0700      7592-1919 1227-9423 Total 4606-0276 5413-5098 Total 3028-9316 0109-0573 Total       Intake    I.V.  --  300 300  1696  464 2160  --  -- --    IV Piggyback  --  -- --  500  -- 500  --  -- --    Total Intake --  464 2660 -- -- --       Output    Urine  --  -- --  3635  1925 5560  250  -- 250    Total Output -- -- -- 3635 1925 5560 250 -- 250          Imaging Results (last 24 hours)     Procedure Component Value Units Date/Time    CT Head Without Contrast [[id number]] Collected:  08/15/19 0751     Updated:  08/15/19 0854    Narrative:       EXAMINATION: CT HEAD WO CONTRAST-      INDICATION: Postcraniotomy; D32.9-Benign neoplasm of meninges,  unspecified.      TECHNIQUE: CT head without intravenous contrast.     The radiation dose reduction device was turned on for each scan per the  ALARA (As Low as Reasonably Achievable) protocol.     COMPARISON: MRI 08/13/2019.     FINDINGS: Status post right suboccipital craniotomy with right cerebral  meningioma resection. Expected  postsurgical changes including minimal  pneumocephalus in this region, however, no hemorrhage, mass effect or  midline shift. No hydrocephalus with supratentorial structures  unremarkable.       Impression:       Early and expected postoperative changes from right  suboccipital craniotomy and resection of right cerebellar hemisphere  meningioma without evidence for hemorrhage, midline shift or  hydrocephalus.     D:  08/15/2019  E:  08/15/2019             Patient Active Problem List    Diagnosis   • *Meningioma (CMS/HCC) [D32.9]   • Brain tumor (benign) (CMS/HCC) [D33.2]   • Spondylosis with myelopathy, lumbar region [M47.16]   • Atypical chest pain [R07.89]   • Encounter for pre-operative cardiovascular clearance [Z01.810]   • Internal carotid artery dissection (CMS/HCC) [I77.71]   • Brain tumor (CMS/HCC) [D49.6]   • History of colon cancer [Z85.038]        Neurologic Exam  Intact, balance normal with ambulating    Assessment/Plan   Change dressing. Steroids on wean. Continue glucose monitoring and treatment. Ready to move to floor.    KENNEDY Campos, Angel OJEDA MD

## 2019-08-15 NOTE — PROGRESS NOTES
Discharge Planning Assessment  [facility]     Patient Name: Ramila Roldan  MRN: [id number]  Today's Date: 8/15/2019    Admit Date: 8/14/2019    Discharge Needs Assessment     Row Name 08/15/19 1039       Living Environment    Lives With  child(madonna), adult    Name(s) of Who Lives With Patient  Emma  daughter    Current Living Arrangements  home/apartment/condo    Primary Care Provided by  self    Provides Primary Care For  no one    Family Caregiver if Needed  child(madonna), adult    Family Caregiver Names  Emma    Quality of Family Relationships  helpful;involved;supportive    Able to Return to Prior Arrangements  yes    Living Arrangement Comments  Pt lives with daughter and grandaughter in [facility].       Resource/Environmental Concerns    Resource/Environmental Concerns  none       Transition Planning    Patient/Family Anticipates Transition to  home with family    Patient/Family Anticipated Services at Transition  none       Discharge Needs Assessment    Readmission Within the Last 30 Days  no previous admission in last 30 days    Concerns to be Addressed  no discharge needs identified    Equipment Currently Used at Home  nebulizer;grab bar    Anticipated Changes Related to Illness  none    Equipment Needed After Discharge  none        Discharge Plan     Row Name 08/15/19 1041       Plan    Plan  Home    Patient/Family in Agreement with Plan  yes    Plan Comments  Met with patient at BS. Pt lives with daughter and granddaughter in [facility]. Pt is independent with ADL's she does use walking stick at times. Pt has not had HH. PCP is Dr Carmona.  Pt has [employer] insurance with Rx coverage. Plan is home with daughter at discharge.    Final Discharge Disposition Code  01 - home or self-care        Destination      No service coordination in this encounter.      Durable Medical Equipment      No service coordination in this encounter.      Dialysis/Infusion      No service coordination in this  encounter.      Home Medical Care      No service coordination in this encounter.      Therapy      No service coordination in this encounter.      Community Resources      No service coordination in this encounter.        Expected Discharge Date and Time     Expected Discharge Date Expected Discharge Time    Aug 18, 2019         Demographic Summary     Row Name 08/15/19 1038       General Information    Admission Type  inpatient    Arrived From  home    Referral Source  admission list    Reason for Consult  discharge planning    Preferred Language  English       Contact Information    Permission Granted to Share Info With          Functional Status     Row Name 08/15/19 1038       Functional Status    Usual Activity Tolerance  good       Functional Status, IADL    Medications  independent    Meal Preparation  independent    Housekeeping  independent    Laundry  independent    Shopping  independent        Psychosocial    No documentation.       Abuse/Neglect    No documentation.       Legal    No documentation.       Substance Abuse    No documentation.       Patient Forms    No documentation.           Dahlia Garner RN

## 2019-08-15 NOTE — PLAN OF CARE
Problem: Patient Care Overview  Goal: Plan of Care Review  Outcome: Ongoing (interventions implemented as appropriate)   08/15/19 8158   Coping/Psychosocial   Plan of Care Reviewed With patient   Plan of Care Review   Progress improving   OTHER   Outcome Summary Patient doing well. Removed art line, richardson, and up to the chair this am. Has been ambulating without difficulty. c/o mild pain that has been controlled with scheduled tylenol. VSS, will continue to monitor.        Problem: Skin Injury Risk (Adult)  Goal: Identify Related Risk Factors and Signs and Symptoms  Outcome: Ongoing (interventions implemented as appropriate)      Problem: Craniotomy/Craniectomy/Cranioplasty (Adult)  Goal: Signs and Symptoms of Listed Potential Problems Will be Absent, Minimized or Managed (Craniotomy/Craniectomy/Cranioplasty)  Outcome: Ongoing (interventions implemented as appropriate)

## 2019-08-15 NOTE — PROGRESS NOTES
Clinical Nutrition Note      Patient Name: Ramila Roldan  MRN: [id number]  Admission date: 8/14/2019      Multidisciplinary Rounds    Additional information obtained during MDR:  RN reports pt doing well s/p tumor resection; nothing needed at this time.    Current diet: Diet Regular    Pertinent medical data reviewed  Nursing admission nutrition risk screen not completed    Intervention:  Plan of care and goals reviewed    Monitor:  RD to follow per protocol      Blanca Quach MS,RD,LD  08/15/19 10:40 AM  Time: 20 mins

## 2019-08-16 VITALS
RESPIRATION RATE: 18 BRPM | SYSTOLIC BLOOD PRESSURE: 164 MMHG | TEMPERATURE: 98.1 F | HEART RATE: 68 BPM | DIASTOLIC BLOOD PRESSURE: 79 MMHG | OXYGEN SATURATION: 95 %

## 2019-08-16 LAB
GLUCOSE BLDC GLUCOMTR-MCNC: 110 MG/DL (ref 70–130)
GLUCOSE BLDC GLUCOMTR-MCNC: 110 MG/DL (ref 70–130)

## 2019-08-16 PROCEDURE — 25010000002 DEXAMETHASONE PER 1 MG: Performed by: NEUROLOGICAL SURGERY

## 2019-08-16 PROCEDURE — 82962 GLUCOSE BLOOD TEST: CPT

## 2019-08-16 RX ORDER — OXYCODONE AND ACETAMINOPHEN 7.5; 325 MG/1; MG/1
1 TABLET ORAL EVERY 6 HOURS PRN
Qty: 45 TABLET | Refills: 0 | Status: SHIPPED | OUTPATIENT
Start: 2019-08-16 | End: 2019-08-24

## 2019-08-16 RX ORDER — DOCUSATE SODIUM 100 MG/1
100 CAPSULE, LIQUID FILLED ORAL 2 TIMES DAILY
Qty: 40 CAPSULE | Refills: 1 | Status: SHIPPED | OUTPATIENT
Start: 2019-08-16 | End: 2019-11-19

## 2019-08-16 RX ADMIN — OXYCODONE HYDROCHLORIDE AND ACETAMINOPHEN 2 TABLET: 7.5; 325 TABLET ORAL at 02:06

## 2019-08-16 RX ADMIN — ACETAMINOPHEN 500 MG: 500 TABLET, FILM COATED ORAL at 08:11

## 2019-08-16 RX ADMIN — OXYCODONE HYDROCHLORIDE AND ACETAMINOPHEN 2 TABLET: 7.5; 325 TABLET ORAL at 12:59

## 2019-08-16 RX ADMIN — DOCUSATE SODIUM 100 MG: 100 CAPSULE, LIQUID FILLED ORAL at 08:11

## 2019-08-16 RX ADMIN — INSULIN LISPRO 10 UNITS: 100 INJECTION, SUSPENSION SUBCUTANEOUS at 08:18

## 2019-08-16 RX ADMIN — DEXAMETHASONE SODIUM PHOSPHATE 2 MG: 4 INJECTION, SOLUTION INTRA-ARTICULAR; INTRALESIONAL; INTRAMUSCULAR; INTRAVENOUS; SOFT TISSUE at 09:56

## 2019-08-16 RX ADMIN — DEXAMETHASONE SODIUM PHOSPHATE 2 MG: 4 INJECTION, SOLUTION INTRA-ARTICULAR; INTRALESIONAL; INTRAMUSCULAR; INTRAVENOUS; SOFT TISSUE at 04:22

## 2019-08-16 NOTE — PROGRESS NOTES
"Clinical Nutrition Note      Patient Name: Ramila Roldan  MRN: [id number]  Admission date: 8/14/2019      Multidisciplinary Rounds    Additional information obtained during MDR:  RN reports pt doing well; ready for transfer to med/surg floor but orders not entered.    Current diet: Diet Regular    Pertinent medical data reviewed  No nutrition risk identified on nursing screen; MST score \"0\"    Intervention:  Plan of care and goals reviewed    Monitor:  RD to follow per protocol      Blanca Quach MS,RD,LD  08/16/19 10:56 AM  Time: 20 mins       "

## 2019-08-16 NOTE — PLAN OF CARE
Problem: Patient Care Overview  Goal: Plan of Care Review  Outcome: Outcome(s) achieved Date Met: 08/16/19 08/16/19 1215   Coping/Psychosocial   Plan of Care Reviewed With patient   Plan of Care Review   Progress improving   OTHER   Outcome Summary Preparing pt for discharge. Up in chair and ambulating in rodriguez. Good appetite. Voiding in BR. Stool softeners given. Pain reported as minimal. Tytlenol PO given. All instructions, scripts and appts to be reviewed prior to D/C     Goal: Interprofessional Rounds/Family Conf  Outcome: Outcome(s) achieved Date Met: 08/16/19      Problem: Craniotomy/Craniectomy/Cranioplasty (Adult)  Goal: Anesthesia/Sedation Recovery  Outcome: Outcome(s) achieved Date Met: 08/16/19

## 2019-08-16 NOTE — DISCHARGE INSTR - ACTIVITY
Resume regular diet. Drink plenty of fluids. Use stool softeners as needed     No heavy lifting, Light regular activity    Keep dressing clean and dry for 2-3 more days.     May shower when dressing off. Air dry after shower. May leave uncovered

## 2019-08-16 NOTE — NURSING NOTE
Prescriptions picked up from retail pharmacy. Pt discharged. To daughter's vehicle in wheelchair. Cell phone and , blankets clothing prescriptions and various sized bags taken with patient

## 2019-08-16 NOTE — DISCHARGE SUMMARY
Date of Discharge:  8/16/2019    Nito Stone MD    Discharge Diagnosis: Right suboccipital meningioma  Procedures Performed  Procedure(s):  CRANIOTOMY SUBOCCIPITAL WITH STEALTH RIGHT       Summary of Hospitalization: This is a 64-year-old female admitted hospital at this time for elective excision of a previously diagnosed right suboccipital meningioma attached to the lateral sinus.  She was admitted at this time for surgical intervention using Stealth coordinated craniotomy.    She underwent suboccipital craniectomy and removal of tumor from within the lateral sinus.  This was followed by dural grafting and cranioplasty.    She did well incurring no complications.  Postoperative CT scan showed no abnormalities.  She has been [facility].        Condition on Discharge: Satisfactory    Discharge Disposition  Home or Self Care    Discharge Medications     Discharge Medications      New Medications      Instructions Start Date   docusate sodium 100 MG capsule   100 mg, Oral, 2 Times Daily      oxyCODONE-acetaminophen 7.5-325 MG per tablet  Commonly known as:  PERCOCET   1 tablet, Oral, Every 6 Hours PRN         Continue These Medications      Instructions Start Date   aspirin 81 MG chewable tablet   81 mg, Oral, Daily      AZO-CRANBERRY PO   1 tablet, Oral, Daily      CALCIUM LACTATE PO   Oral, As Needed      COD LIVER OIL PO   2 mL, Oral, Daily      melatonin 1 MG tablet   Oral, Nightly PRN      MULTI-B COMPLEX PO   8 tablets, Oral, 3 Times Daily      Potassium 99 MG tablet   99 mg, Oral, As Needed         Stop These Medications    ferrous sulfate 324 (65 Fe) MG tablet delayed-release EC tablet              Follow-up Appointments  No future appointments.  Additional Instructions for the Follow-ups that You Need to Schedule     Discharge Follow-up with Specialty: neurosurgy; 1 Week   As directed      Specialty:  neurosurgy    Follow Up:  1 Week     Follow Up Details:  Suture removal in 10 days [facility]                  Angel Birch MD  08/16/19  11:15 AM

## 2019-08-16 NOTE — NURSING NOTE
Pt discharge teaching done. IVs removed. Dressing. Daughter enroute for . Scripts faxed to retail pharmacy at 1234

## 2019-08-16 NOTE — PLAN OF CARE
Problem: Patient Care Overview  Goal: Plan of Care Review  Outcome: Ongoing (interventions implemented as appropriate)   08/15/19 1528 08/15/19 2000 08/16/19 0403   Coping/Psychosocial   Plan of Care Reviewed With --  patient --    Plan of Care Review   Progress improving --  --    OTHER   Outcome Summary --  --  Patient VSS stable over night; up to bathroom multiple times and ambulated in rodriguez without issue; pain covered by PRN medication; patient has been able to sleep tonight; awaiting discharge to floor or home pending Neuro orders; will continue to monitor.       Problem: Skin Injury Risk (Adult)  Goal: Identify Related Risk Factors and Signs and Symptoms  Outcome: Ongoing (interventions implemented as appropriate)   08/15/19 0455   Skin Injury Risk (Adult)   Related Risk Factors (Skin Injury Risk) advanced age;critical care admission;medical devices;medication;mobility impaired     Goal: Skin Health and Integrity  Outcome: Ongoing (interventions implemented as appropriate)   08/15/19 0455   Skin Injury Risk (Adult)   Skin Health and Integrity achieves outcome       Problem: Craniotomy/Craniectomy/Cranioplasty (Adult)  Goal: Signs and Symptoms of Listed Potential Problems Will be Absent, Minimized or Managed (Craniotomy/Craniectomy/Cranioplasty)   08/15/19 1528 08/16/19 0403   Goal/Outcome Evaluation   Problems Assessed (Craniotomy/Craniectomy/Cranioplasty) --  all   Problems Present (Craniotomy/ectomy) pain --

## 2019-08-17 ENCOUNTER — READMISSION MANAGEMENT (OUTPATIENT)
Dept: CALL CENTER | Facility: HOSPITAL | Age: 64
End: 2019-08-17

## 2019-08-18 ENCOUNTER — READMISSION MANAGEMENT (OUTPATIENT)
Dept: CALL CENTER | Facility: HOSPITAL | Age: 64
End: 2019-08-18

## 2019-08-18 NOTE — OUTREACH NOTE
Prep Survey      Responses   Facility patient discharged from?  [facility]   Is patient eligible?  Yes   Discharge diagnosis  sp craniotomy suboccipital w/ stealth rt.  meningioma, benign brain tumor   Does the patient have one of the following disease processes/diagnoses(primary or secondary)?  General Surgery   Does the patient have Home health ordered?  No   Is there a DME ordered?  No   General alerts for this patient  Lives with grand daughter   Prep survey completed?  Yes          Queenie Hilario RN

## 2019-08-18 NOTE — OUTREACH NOTE
General Surgery Week 1 Survey      Responses   Facility patient discharged from?  [facility]   Does the patient have one of the following disease processes/diagnoses(primary or secondary)?  General Surgery   Is there a successful TCM telephone encounter documented?  No   Week 1 attempt successful?  No   Unsuccessful attempts  Attempt 1          Areli Heredia, RN

## 2019-08-19 ENCOUNTER — READMISSION MANAGEMENT (OUTPATIENT)
Dept: CALL CENTER | Facility: HOSPITAL | Age: 64
End: 2019-08-19

## 2019-08-19 ENCOUNTER — TELEPHONE (OUTPATIENT)
Dept: NEUROSURGERY | Facility: CLINIC | Age: 64
End: 2019-08-19

## 2019-08-19 LAB
CYTO UR: NORMAL
LAB AP CASE REPORT: NORMAL
LAB AP CLINICAL INFORMATION: NORMAL
Lab: NORMAL
PATH REPORT.ADDENDUM SPEC: NORMAL
PATH REPORT.FINAL DX SPEC: NORMAL
PATH REPORT.GROSS SPEC: NORMAL

## 2019-08-19 NOTE — PAYOR COMM NOTE
"Shannan Colin (64 y.o. Female)     Date of Birth Social Security Number Address Home Phone MRN    1955  [address]  [facility] [address]  [id number]    Rastafarian Marital Status          Non-Religious        Admission Date Admission Type Admitting Provider Attending Provider Department, Room/Bed    8/14/19 Elective Angel Birch MD  [facility] 2B ICU, N239/1    Discharge Date Discharge Disposition Discharge Destination        8/16/2019 Home or Self Care Home             Attending Provider:  (none)   Allergies:  Brilinta [Ticagrelor], Plavix [Clopidogrel]    Isolation:  None   Infection:  None   Code Status:  Prior    Ht:  160 cm (63\")   Wt:  81.8 kg (180 lb 5.4 oz)    Admission Cmt:  None   Principal Problem:  Meningioma (CMS/HCC) [D32.9] More...                 Active Insurance as of 8/14/2019     Primary Coverage     Payor Plan Insurance Group Employer/Plan Group    [employer] [place] [employer] [place]     Payor Plan Address Payor Plan Phone Number Payor Plan Fax Number Effective Dates    PO  [phone]  6/13/2019 - None Entered    [facility] [id number]       Subscriber Name Subscriber Birth Date Member ID       SHANNAN COLIN 1955 [id number]                 Emergency Contacts      (Rel.) Home Phone Work Phone Mobile Phone    ESE DONNELLY (Daughter) -- -- [phone]    SocratesEsther (Sister) -- -- [phone]               Discharge Summary      Angel Birch MD at 8/16/2019 11:14 AM            Date of Discharge:  8/16/2019    Nito Stone MD    Discharge Diagnosis: Right suboccipital meningioma  Procedures Performed  Procedure(s):  CRANIOTOMY SUBOCCIPITAL WITH STEALTH RIGHT       Summary of Hospitalization: This is a 64-year-old female admitted hospital at this time for elective excision of a previously diagnosed right suboccipital meningioma attached to the lateral sinus.  She was admitted at this time for " surgical intervention using Stealth coordinated craniotomy.    She underwent suboccipital craniectomy and removal of tumor from within the lateral sinus.  This was followed by dural grafting and cranioplasty.    She did well incurring no complications.  Postoperative CT scan showed no abnormalities.  She has been [facility] removal.        Condition on Discharge: Satisfactory    Discharge Disposition  Home or Self Care    Discharge Medications     Discharge Medications      New Medications      Instructions Start Date   docusate sodium 100 MG capsule   100 mg, Oral, 2 Times Daily      oxyCODONE-acetaminophen 7.5-325 MG per tablet  Commonly known as:  PERCOCET   1 tablet, Oral, Every 6 Hours PRN         Continue These Medications      Instructions Start Date   aspirin 81 MG chewable tablet   81 mg, Oral, Daily      AZO-CRANBERRY PO   1 tablet, Oral, Daily      CALCIUM LACTATE PO   Oral, As Needed      COD LIVER OIL PO   2 mL, Oral, Daily      melatonin 1 MG tablet   Oral, Nightly PRN      MULTI-B COMPLEX PO   8 tablets, Oral, 3 Times Daily      Potassium 99 MG tablet   99 mg, Oral, As Needed         Stop These Medications    ferrous sulfate 324 (65 Fe) MG tablet delayed-release EC tablet              Follow-up Appointments  No future appointments.  Additional Instructions for the Follow-ups that You Need to Schedule     Discharge Follow-up with Specialty: neurosurgy; 1 Week   As directed      Specialty:  neurosurgy    Follow Up:  1 Week    Follow Up Details:  Suture removal in 10 days [facility]                  Angel Birch MD  08/16/19  11:15 AM              Electronically signed by Angel Birch MD at 8/16/2019 11:16 AM

## 2019-08-19 NOTE — TELEPHONE ENCOUNTER
Provider:  [facility]  Caller: patient  Time of call:  10:41   Phone #:  [phone]  Surgery:  Craniotomy suboccipital with stealth  Surgery Date:  08/14/19  Last visit:   same  Next visit: 08/27/19    LUZ MARIA:         Reason for call:     Patient called and said when she took her Percocet Sat and Sun it made her very nervous.  She took Tylenol this am and it made her nervous as well.  She wants to know if she can try Ibuprofen?

## 2019-08-19 NOTE — OUTREACH NOTE
General Surgery Week 1 Survey      Responses   Facility patient discharged from?  [place]   Does the patient have one of the following disease processes/diagnoses(primary or secondary)?  General Surgery   Is there a successful TCM telephone encounter documented?  No   Week 1 attempt successful?  Yes   Call start time  1002   Call end time  1010   Discharge diagnosis  sp craniotomy suboccipital w/ stealth rt.  meningioma, benign brain tumor   Is patient permission given to speak with other caregiver?  Yes   List who call center can speak with  Daughters   Meds reviewed with patient/caregiver?  Yes   Is the patient having any side effects they believe may be caused by any medication additions or changes?  Yes   Side effects comments   Percocet causes jitteriness, has a call in to MD requesting to take Ibuprofen, advised Tylenol unles otherwise directed by surgeon   Does the patient have all medications related to this admission filled (includes all antibiotics, pain medications, etc.)  Yes   Is the patient taking all medications as directed (includes completed medication regime)?  Yes   Does the patient have a follow up appointment scheduled with their surgeon?  Yes   Has the patient kept scheduled appointments due by today?  N/A   Comments  Next Tuesday 08/27 for suture removal.   Has home health visited the patient within 72 hours of discharge?  N/A   Psychosocial issues?  No   Did the patient receive a copy of their discharge instructions?  Yes   Nursing interventions  Reviewed instructions with patient   What is the patient's perception of their health status since discharge?  Improving   Nursing interventions  Nurse provided patient education   Is the patient /caregiver able to teach back basic post-op care?  Drive as instructed by MD in discharge instructions, Take showers only when approved by MD-sponge bathe until then, No tub bath, swimming, or hot tub until instructed by MD, Keep incision areas clean,dry  and protected, Do not remove steri-strips, Lifting as instructed by MD in discharge instructions   Is the patient/caregiver able to teach back signs and symptoms of incisional infection?  Increased redness, swelling or pain at the incisonal site, Increased drainage or bleeding, Incisional warmth, Pus or odor from incision, Fever   Is the patient/caregiver able to teach back steps to recovery at home?  Set small, achievable goals for return to baseline health, Rest and rebuild strength, gradually increase activity, Eat a well-balance diet   Is the patient/caregiver able to teach back the hierarchy of who to call/visit for symptoms/problems? PCP, Specialist, Home health nurse, Urgent Care, ED, 911  Yes   Week 1 call completed?  Yes          Saray Flores RN

## 2019-08-26 ENCOUNTER — READMISSION MANAGEMENT (OUTPATIENT)
Dept: CALL CENTER | Facility: HOSPITAL | Age: 64
End: 2019-08-26

## 2019-08-26 NOTE — OUTREACH NOTE
General Surgery Week 2 Survey      Responses   Facility patient discharged from?  [place]   Does the patient have one of the following disease processes/diagnoses(primary or secondary)?  General Surgery   Week 2 attempt successful?  Yes   Call start time  1130   Call end time  1138   General alerts for this patient  Lives with grand daughter   Discharge diagnosis  sp craniotomy suboccipital w/ stealth rt.  meningioma, benign brain tumor   Meds reviewed with patient/caregiver?  Yes   Is the patient having any side effects they believe may be caused by any medication additions or changes?  No   Does the patient have all medications related to this admission filled (includes all antibiotics, pain medications, etc.)  Yes   Is the patient taking all medications as directed (includes completed medication regime)?  Yes   Does the patient have a follow up appointment scheduled with their surgeon?  Yes   Has the patient kept scheduled appointments due by today?  No   Comments  tomorrow    Psychosocial issues?  No   Did the patient receive a copy of their discharge instructions?  Yes   Nursing interventions  Reviewed instructions with patient   What is the patient's perception of their health status since discharge?  Improving   Is the patient /caregiver able to teach back basic post-op care?  Drive as instructed by MD in discharge instructions, Take showers only when approved by MD-sponge bathe until then, No tub bath, swimming, or hot tub until instructed by MD, Keep incision areas clean,dry and protected, Do not remove steri-strips, Lifting as instructed by MD in discharge instructions   Is the patient/caregiver able to teach back signs and symptoms of incisional infection?  Increased redness, swelling or pain at the incisonal site, Increased drainage or bleeding, Incisional warmth, Pus or odor from incision, Fever   Is the patient/caregiver able to teach back steps to recovery at home?  Set small, achievable goals for  return to baseline health, Rest and rebuild strength, gradually increase activity, Eat a well-balance diet   Is the patient/caregiver able to teach back the hierarchy of who to call/visit for symptoms/problems? PCP, Specialist, Home health nurse, Urgent Care, ED, 911  Yes   Week 2 call completed?  Yes          Kassi Taveras, RN

## 2019-08-27 ENCOUNTER — OFFICE VISIT (OUTPATIENT)
Dept: NEUROSURGERY | Facility: CLINIC | Age: 64
End: 2019-08-27

## 2019-08-27 VITALS
HEIGHT: 63 IN | SYSTOLIC BLOOD PRESSURE: 162 MMHG | DIASTOLIC BLOOD PRESSURE: 100 MMHG | TEMPERATURE: 98.8 F | WEIGHT: 181.8 LBS | BODY MASS INDEX: 32.21 KG/M2

## 2019-08-27 DIAGNOSIS — D32.9 MENINGIOMA (HCC): Primary | ICD-10-CM

## 2019-08-27 PROCEDURE — 99024 POSTOP FOLLOW-UP VISIT: CPT | Performed by: PHYSICIAN ASSISTANT

## 2019-08-27 NOTE — PROGRESS NOTES
Ramila Roldan  1955 08/27/2019  [id number]    CC: f/u after surgery    HPI:  S/P s/p right  Crani for meningioma on 8/14/19.  She looks great on today's follow-up and reports that she is feeling pretty well without any headaches.  She reports she did 3 loads of laundry yesterday.  She has had no difficulties with her incision and her plan is to remove her sutures today.  She is having some right TMJ discomfort and reports that she had this prior to her surgery.  She is also got a pain in the right upper quadrant below her rib cage that bothers her sometimes and that too was present prior to surgery.  She has an appointment with Dr. Dobbs next month.    Past Medical History:   Diagnosis Date   • Anemia    • Anemia    • Arthritis    • Cancer (CMS/HCC)     colon   • H/O bladder infections    • History of transfusion    • Low back pain    • Murmur    • Transfusion history        Allergies   Allergen Reactions   • Brilinta [Ticagrelor] Other (See Comments)     Chest pain      • Plavix [Clopidogrel] Other (See Comments)     Chest pain plus other symptoms          Current Outpatient Medications:   •  aspirin 81 MG chewable tablet, Chew 1 tablet Daily., Disp: 30 tablet, Rfl: 5  •  AZO-CRANBERRY PO, Take 1 tablet by mouth Daily., Disp: , Rfl:   •  B Complex-Biotin-FA (MULTI-B COMPLEX PO), Take 8 tablets by mouth 3 (Three) Times a Day., Disp: , Rfl:   •  CALCIUM LACTATE PO, Take  by mouth As Needed., Disp: , Rfl:   •  COD LIVER OIL PO, Take 2 mL by mouth Daily., Disp: , Rfl:   •  docusate sodium (COLACE) 100 MG capsule, Take 1 capsule by mouth 2 (Two) Times a Day., Disp: 40 capsule, Rfl: 1  •  melatonin 1 MG tablet, Take  by mouth At Night As Needed for Sleep., Disp: , Rfl:   •  Potassium 99 MG tablet, Take 99 mg by mouth As Needed., Disp: , Rfl:     Review of Systems   Constitutional: Positive for activity change. Negative for appetite change, chills, diaphoresis, fatigue, fever and unexpected weight change.    HENT: Positive for congestion and ear pain. Negative for dental problem, drooling, ear discharge, facial swelling, hearing loss, mouth sores, nosebleeds, postnasal drip, rhinorrhea, sinus pressure, sneezing, sore throat, tinnitus, trouble swallowing and voice change.    Eyes: Positive for visual disturbance. Negative for photophobia, pain, discharge, redness and itching.   Respiratory: Positive for chest tightness. Negative for apnea, cough, choking, shortness of breath, wheezing and stridor.    Cardiovascular: Positive for chest pain. Negative for palpitations and leg swelling.   Gastrointestinal: Negative for abdominal distention, abdominal pain, anal bleeding, blood in stool, constipation, diarrhea, nausea, rectal pain and vomiting.   Endocrine: Negative for cold intolerance, heat intolerance, polydipsia, polyphagia and polyuria.   Genitourinary: Positive for flank pain. Negative for decreased urine volume, difficulty urinating, dysuria, enuresis, frequency, genital sores, hematuria and urgency.   Musculoskeletal: Positive for myalgias and neck stiffness. Negative for arthralgias, back pain, gait problem, joint swelling and neck pain.   Skin: Negative for color change, pallor, rash and wound.   Allergic/Immunologic: Negative for environmental allergies, food allergies and immunocompromised state.   Neurological: Positive for numbness. Negative for dizziness, tremors, seizures, syncope, facial asymmetry, speech difficulty, weakness, light-headedness and headaches.   Hematological: Negative for adenopathy. Does not bruise/bleed easily.   Psychiatric/Behavioral: Negative for agitation, behavioral problems, confusion, decreased concentration, dysphoric mood, hallucinations, self-injury, sleep disturbance and suicidal ideas. The patient is not nervous/anxious and is not hyperactive.    All other systems reviewed and are negative.      PE:  /100 (BP Location: Right arm, Patient Position: Sitting, Cuff Size:  "Adult)   Temp 98.8 °F (37.1 °C) (Temporal)   Ht 160 cm (63\")   Wt 82.5 kg (181 lb 12.8 oz)   BMI 32.20 kg/m²   Heart- RRR  Lungs- no wheezing, normal expansion    Wound-healing nicely, the incision was cleaned and the sutures were removed today without any difficulties.    Neurologic Exam her neurological exam is normal.      MDM   1.  Status post craniotomy for removal of a subtentorial left cerebellar meningioma.  Headaches have resolved.  I have discussed wound care with the patient and answered her questions.  2.  We will plan on follow-up MRI of the brain in approximately 4 weeks and follow-up with Dr. Birch.    Katty Borjas, PAC    "

## 2019-09-03 ENCOUNTER — READMISSION MANAGEMENT (OUTPATIENT)
Dept: CALL CENTER | Facility: HOSPITAL | Age: 64
End: 2019-09-03

## 2019-09-03 NOTE — OUTREACH NOTE
General Surgery Week 3 Survey      Responses   Facility patient discharged from?  [place]   Does the patient have one of the following disease processes/diagnoses(primary or secondary)?  General Surgery   Week 3 attempt successful?  Yes   Call start time  0951   Call end time  0956   Discharge diagnosis  sp craniotomy suboccipital w/ stealth rt.  meningioma, benign brain tumor   Meds reviewed with patient/caregiver?  Yes   Is the patient taking all medications as directed (includes completed medication regime)?  Yes   Has the patient kept scheduled appointments due by today?  Yes   Comments  Incision healing well and she puts some ointment on her incision   What is the patient's perception of their health status since discharge?  Improving   Is the patient /caregiver able to teach back basic post-op care?  Keep incision areas clean,dry and protected   Is the patient/caregiver able to teach back signs and symptoms of incisional infection?  Increased redness, swelling or pain at the incisonal site, Increased drainage or bleeding, Incisional warmth, Pus or odor from incision, Fever   Is the patient/caregiver able to teach back steps to recovery at home?  Set small, achievable goals for return to baseline health, Rest and rebuild strength, gradually increase activity, Eat a well-balance diet   Is the patient/caregiver able to teach back the hierarchy of who to call/visit for symptoms/problems? PCP, Specialist, Home health nurse, Urgent Care, ED, 911  Yes   Additional teach back comments  has a cold right now and is trying to rest and drink plenty of fluids. Incision doing well and she has had sutures out.   Week 3 call completed?  Yes          Angelica Menjivar RN

## 2019-09-10 ENCOUNTER — READMISSION MANAGEMENT (OUTPATIENT)
Dept: CALL CENTER | Facility: HOSPITAL | Age: 64
End: 2019-09-10

## 2019-09-10 NOTE — OUTREACH NOTE
General Surgery Week 4 Survey      Responses   Facility patient discharged from?  [place]   Does the patient have one of the following disease processes/diagnoses(primary or secondary)?  General Surgery   Week 4 attempt successful?  Yes   Call start time  0921   Call end time  0927   Discharge diagnosis  sp craniotomy suboccipital w/ stealth rt.  meningioma, benign brain tumor   Is the patient taking all medications as directed (includes completed medication regime)?  Yes   Has the patient kept scheduled appointments due by today?  Yes   Is the patient still receiving Home Health Services?  N/A   What is the patient's perception of their health status since discharge?  Improving   Week 4 call completed?  Yes   Would the patient like one additional call?  No   Graduated  Yes   Did the patient feel the follow up calls were helpful during their recovery period?  Yes   Was the number of calls appropriate?  Yes          Saray Flores RN

## 2019-09-13 ENCOUNTER — READMISSION MANAGEMENT (OUTPATIENT)
Dept: CALL CENTER | Facility: HOSPITAL | Age: 64
End: 2019-09-13

## 2019-09-13 ENCOUNTER — TELEPHONE (OUTPATIENT)
Dept: NEUROSURGERY | Facility: CLINIC | Age: 64
End: 2019-09-13

## 2019-09-13 NOTE — OUTREACH NOTE
General Surgery Week 4 Survey      Responses   Facility patient discharged from?  [place]   Does the patient have one of the following disease processes/diagnoses(primary or secondary)?  General Surgery   Week 4 attempt successful?  Yes   Call start time  0939   Call end time  0943   Discharge diagnosis  sp craniotomy suboccipital w/ stealth rt.  meningioma, benign brain tumor   Week 4 call completed?  Yes   Would the patient like one additional call?  No   Graduated  Yes   Wrap up additional comments  Pt called back today after being disenrolled from program. She reports that she had new Aura develop after she caught a slight cold on Wed AM 9-11-19. Reports that she is slowly improving. However, she is still concerned and called NeuroSurgery today. MD unavalable at the moment so she called RN call center. Advised to try to get in touch with PA if possible. Stated if that fails she can reach out to PCP/UTC or go to ER if s/s worsen. She doesnt seem to be in any distress at time of call.          Rolly Veliz RN

## 2019-09-13 NOTE — TELEPHONE ENCOUNTER
----- Message from Rosalba Magana sent at 9/13/2019  9:33 AM EDT -----  Contact: [phone]  PATIENT CALLING TO REPORT SHE IS HAVING SOME SORT OF AN AURA.  WHEN HER EYES ARE OPEN IT IS LIKE A LARGE FLOATER; WHEN HER EYES ARE CLOSED IS IT FLASHING.  USUALLY LAST FOR A FEW MINUTES THEN GOES AWAY, BUT HAS HAD IT LAST FOR A COUPLE OF DAYS.

## 2019-09-30 ENCOUNTER — OFFICE VISIT (OUTPATIENT)
Dept: NEUROSURGERY | Facility: CLINIC | Age: 64
End: 2019-09-30

## 2019-09-30 ENCOUNTER — HOSPITAL ENCOUNTER (OUTPATIENT)
Dept: MRI IMAGING | Facility: HOSPITAL | Age: 64
Discharge: HOME | End: 2019-09-30
Admitting: PHYSICIAN ASSISTANT

## 2019-09-30 VITALS
HEIGHT: 63 IN | DIASTOLIC BLOOD PRESSURE: 88 MMHG | RESPIRATION RATE: 18 BRPM | OXYGEN SATURATION: 98 % | WEIGHT: 181.4 LBS | SYSTOLIC BLOOD PRESSURE: 142 MMHG | HEART RATE: 94 BPM | BODY MASS INDEX: 32.14 KG/M2 | TEMPERATURE: 98.4 F

## 2019-09-30 DIAGNOSIS — D32.9 MENINGIOMA (HCC): ICD-10-CM

## 2019-09-30 DIAGNOSIS — D32.9 MENINGIOMA (HCC): Primary | ICD-10-CM

## 2019-09-30 PROCEDURE — 82565 ASSAY OF CREATININE: CPT

## 2019-09-30 PROCEDURE — 70553 MRI BRAIN STEM W/O & W/DYE: CPT

## 2019-09-30 PROCEDURE — 99024 POSTOP FOLLOW-UP VISIT: CPT | Performed by: NEUROLOGICAL SURGERY

## 2019-09-30 PROCEDURE — A9577 INJ MULTIHANCE: HCPCS | Performed by: PHYSICIAN ASSISTANT

## 2019-09-30 PROCEDURE — 0 GADOBENATE DIMEGLUMINE 529 MG/ML SOLUTION: Performed by: PHYSICIAN ASSISTANT

## 2019-09-30 RX ADMIN — GADOBENATE DIMEGLUMINE 15 ML: 529 INJECTION, SOLUTION INTRAVENOUS at 09:52

## 2019-09-30 NOTE — PATIENT INSTRUCTIONS
After seeing Dr. Bright and have the EEG:    Call Dr. Birch on a Monday or Tuesday with an update.      Ask for Rosalba () and leave a message for  Dr. Birch.     He will call you back at the end of the day as soon as he can.     [phone]

## 2019-09-30 NOTE — PROGRESS NOTES
Ramila Roldan  1955  [id number]                        CHIEF COMPLAINT: Is post resection of posterior fossa cerebellar meningioma         MEDICAL HISTORY SINCE LAST ENCOUNTER: She is doing well from a neurosurgical perspective.  She reports today for follow-up visit subsequent to her craniotomy on 8/14/2019 at which time a meningioma was removed.  It was attached to and within the lateral sinus on the right.  We had a complete removal, however.  The issues that she has had at this time are auras and issues with poor memory..           Past Medical History:   Diagnosis Date   • Anemia    • Anemia    • Arthritis    • Cancer (CMS/HCC)     colon   • H/O bladder infections    • History of transfusion    • Low back pain    • Murmur    • Transfusion history               Past Surgical History:   Procedure Laterality Date   • CEREBRAL ANGIOGRAM      06/25/2019 PER DR. PETERS.   • CHOLECYSTECTOMY     • COLON RESECTION     • COLONOSCOPY  2019   • CRANIOTOMY Right 8/14/2019    Procedure: CRANIOTOMY SUBOCCIPITAL WITH STEALTH RIGHT;  Surgeon: Angel Birch MD;  [facility];  Service: Neurosurgery   • ENDOSCOPY     • INTERVENTIONAL RADIOLOGY PROCEDURE Bilateral 6/25/2019    Procedure: Carotid Cerebral Angiogram;  Surgeon: Yanick Peters MD;  Location:  [facility];  Service: Interventional Radiology              Family History   Problem Relation Age of Onset   • Heart disease Mother    • Heart disease Father    • Cancer Sister               Social History     Socioeconomic History   • Marital status:      Spouse name: Not on file   • Number of children: Not on file   • Years of education: Not on file   • Highest education level: Not on file   Tobacco Use   • Smoking status: Never Smoker   • Smokeless tobacco: Never Used   Substance and Sexual Activity   • Alcohol use: Yes     Frequency: Never   • Drug use: No   • Sexual activity: Defer              Allergies   Allergen Reactions  "  • Brilinta [Ticagrelor] Other (See Comments)     Chest pain      • Plavix [Clopidogrel] Other (See Comments)     Chest pain plus other symptoms               Current Outpatient Medications:   •  aspirin 81 MG chewable tablet, Chew 1 tablet Daily., Disp: 30 tablet, Rfl: 5  •  AZO-CRANBERRY PO, Take 1 tablet by mouth Daily., Disp: , Rfl:   •  B Complex-Biotin-FA (MULTI-B COMPLEX PO), Take 8 tablets by mouth 3 (Three) Times a Day., Disp: , Rfl:   •  CALCIUM LACTATE PO, Take  by mouth As Needed., Disp: , Rfl:   •  COD LIVER OIL PO, Take 2 mL by mouth Daily., Disp: , Rfl:   •  docusate sodium (COLACE) 100 MG capsule, Take 1 capsule by mouth 2 (Two) Times a Day., Disp: 40 capsule, Rfl: 1  •  melatonin 1 MG tablet, Take  by mouth At Night As Needed for Sleep., Disp: , Rfl:   •  Potassium 99 MG tablet, Take 99 mg by mouth As Needed., Disp: , Rfl:   No current facility-administered medications for this visit.          Review of Systems   Constitutional: Positive for fatigue.   HENT: Positive for ear pain and tinnitus.    Eyes: Positive for photophobia and visual disturbance.   Cardiovascular: Positive for chest pain.   Gastrointestinal: Negative.    Endocrine: Negative.    Genitourinary: Negative.    Musculoskeletal: Positive for myalgias and neck stiffness.   Skin: Negative.    Allergic/Immunologic: Negative.    Neurological: Positive for headaches.   Hematological: Negative.    Psychiatric/Behavioral: Negative.                Vitals:    09/30/19 1203   BP: 142/88   BP Location: Right arm   Patient Position: Sitting   Cuff Size: Adult   Pulse: 94   Resp: 18   Temp: 98.4 °F (36.9 °C)   TempSrc: Temporal   SpO2: 98%   Weight: 82.3 kg (181 lb 6.4 oz)   Height: 160 cm (63\")               EXAMINATION: Wound is well-healed.  I find no evidence of weakness, sensory loss or reflex asymmetry.  No ataxia or dysmetria.            MEDICAL DECISION MAKING: MRI shows no progression at this time           ASSESSMENT/DISPOSITION: I have " asked her to see Dr. Bright with the EEG and neurological evaluation in reference to her memory and the aura that she is experiencing.  It is difficult to determine whether this is related to anxiety which she may will be.  She will return to see me in 6 months for continued surveillance.  My intention is to follow her every 6 months for at least the first 5 years subsequent to initial resection.              I APPRECIATE THE OPPORTUNITY OF THIS REFERRAL. PLEASE CALL IF ANY       QUESTIONS [phone]

## 2019-10-01 DIAGNOSIS — D32.9 MENINGIOMA (HCC): Primary | ICD-10-CM

## 2019-10-04 LAB — CREAT BLDA-MCNC: 0.6 MG/DL (ref 0.6–1.3)

## 2019-11-07 ENCOUNTER — HOSPITAL ENCOUNTER (OUTPATIENT)
Dept: NEUROLOGY | Facility: HOSPITAL | Age: 64
Discharge: HOME | End: 2019-11-07
Admitting: NEUROLOGICAL SURGERY

## 2019-11-07 DIAGNOSIS — D32.9 MENINGIOMA (HCC): ICD-10-CM

## 2019-11-07 PROCEDURE — 95816 EEG AWAKE AND DROWSY: CPT

## 2019-11-12 ENCOUNTER — APPOINTMENT (OUTPATIENT)
Dept: NEUROLOGY | Facility: HOSPITAL | Age: 64
End: 2019-11-12

## 2019-11-19 ENCOUNTER — OFFICE VISIT (OUTPATIENT)
Dept: NEUROLOGY | Facility: CLINIC | Age: 64
End: 2019-11-19

## 2019-11-19 VITALS
SYSTOLIC BLOOD PRESSURE: 132 MMHG | BODY MASS INDEX: 32.07 KG/M2 | WEIGHT: 181 LBS | DIASTOLIC BLOOD PRESSURE: 82 MMHG | HEART RATE: 88 BPM | OXYGEN SATURATION: 97 % | HEIGHT: 63 IN

## 2019-11-19 DIAGNOSIS — H53.9 VISUAL AURA: Primary | ICD-10-CM

## 2019-11-19 PROCEDURE — 99204 OFFICE O/P NEW MOD 45 MIN: CPT | Performed by: PSYCHIATRY & NEUROLOGY

## 2019-11-19 RX ORDER — MAGNESIUM CARB/ALUMINUM HYDROX 105-160MG
TABLET,CHEWABLE ORAL ONCE
COMMUNITY

## 2019-11-19 RX ORDER — RIZATRIPTAN BENZOATE 5 MG/1
TABLET ORAL
Qty: 8 TABLET | Refills: 2 | Status: SHIPPED | OUTPATIENT
Start: 2019-11-19

## 2019-11-19 NOTE — PROGRESS NOTES
"Subjective:    CC: Ramila Roldan is seen today in consultation at the request of Angel Birch MD for Seizures and Anxiety       HPI:  64-year-old female with a history of colon cancer status post colon resection, right cerebellar meningioma status post resection in August, arthritis presents with visual auras.  As per patient she started having visual aura several years ago.  With these she sees a geometric pattern in her field of vision in both eyes that keeps getting bigger.  She also has blurred vision in her eyes prior to the start of her aura.  Initially she would not get a headache with her aura however recently has had a few episodes where she has had a dull headache either at the top or back of her head with occasional nausea.  With her last such episode that lasted for hours patient also had unsteady gait.  It took her several weeks to recover from that episode.  She denies any episodes of confusion or altered awareness with these auras.  Is currently getting them at least once a month.  She recently had her eye checkup and her vision was normal however she was given reading glasses that has helped with her headaches.  She had a 20-minute EEG recently as her neurosurgeon was concerned that these may be seizures which showed intermittent left temporal slowing but no epileptiform activity.  She also had an MRI brain in August after her meningioma resection surgery that showed an area of encephalomalacia in the right cerebellar region but no acute abnormalities.  Also no mesial temporal abnormalities were noted.  Patient denies having any seizures as a child or family history of seizures.  She did have a concussion to the left side of her head while playing basketball in college.  Did have mild memory problems after that.  Patient also does not like taking medications and is mainly into supplements.  She was started on \" blood thinners\" after she had an angiogram prior to her meningioma resection as " she sustained an intimal tear in the right carotid at the time but stopped taking it after a few days as it was causing her to have leg pain.  Currently on no medications.  Of note-I personally reviewed her MRI brain    The following portions of the patient's history were reviewed today and updated as of 11/19/2019  : allergies, current medications, past family history, past medical history, past social history, past surgical history and problem list  These document will be scanned to patient's chart.      Current Outpatient Medications:   •  BLACK COHOSH ROOT PO, Take  by mouth., Disp: , Rfl:   •  CALCIUM LACTATE PO, Take  by mouth As Needed., Disp: , Rfl:   •  Capsicum, Cayenne, (CAYENNE PO), Take  by mouth., Disp: , Rfl:   •  COD LIVER OIL PO, Take 2 mL by mouth Daily., Disp: , Rfl:   •  FIBER PO, Take  by mouth., Disp: , Rfl:   •  JIANG SEAL PO, Take  by mouth., Disp: , Rfl:   •  magnesium citrate 1.745 GM/30ML solution solution, Take  by mouth 1 (One) Time., Disp: , Rfl:   •  Potassium 99 MG tablet, Take 99 mg by mouth As Needed., Disp: , Rfl:   •  TURMERIC PO, Take  by mouth., Disp: , Rfl:   •  rizatriptan (MAXALT) 5 MG tablet, Take at onset of aura.  May repeat in 2 hours if needed.  Do not take over 2 tabs a day or 8 tabs a month, Disp: 8 tablet, Rfl: 2   Past Medical History:   Diagnosis Date   • Anemia    • Anemia    • Arthritis    • Cancer (CMS/HCC)     colon   • H/O bladder infections    • History of transfusion    • Low back pain    • Murmur    • Transfusion history       Past Surgical History:   Procedure Laterality Date   • CEREBRAL ANGIOGRAM      06/25/2019 PER DR. PETERS.   • CHOLECYSTECTOMY     • COLON RESECTION     • COLONOSCOPY  2019   • CRANIOTOMY Right 8/14/2019    Procedure: CRANIOTOMY SUBOCCIPITAL WITH STEALTH RIGHT;  Surgeon: Angel Birch MD;  Location: [facility];  Service: Neurosurgery   • ENDOSCOPY     • INTERVENTIONAL RADIOLOGY PROCEDURE Bilateral 6/25/2019    Procedure: Carotid  "Cerebral Angiogram;  Surgeon: Yanick Burleson MD;  Location: [facility] INVASIVE LOCATION;  Service: Interventional Radiology      Family History   Problem Relation Age of Onset   • Heart disease Mother    • Heart disease Father    • Cancer Sister       Social History     Socioeconomic History   • Marital status:      Spouse name: Not on file   • Number of children: Not on file   • Years of education: Not on file   • Highest education level: Not on file   Tobacco Use   • Smoking status: Never Smoker   • Smokeless tobacco: Never Used   Substance and Sexual Activity   • Alcohol use: Yes     Frequency: Never   • Drug use: No   • Sexual activity: Defer     Review of Systems   Eyes: Positive for visual disturbance.   Musculoskeletal: Positive for gait problem.   Neurological: Positive for weakness, headache and memory problem.   All other systems reviewed and are negative.      Objective:    /82   Pulse 88   Ht 160 cm (63\")   Wt 82.1 kg (181 lb)   SpO2 97%   BMI 32.06 kg/m²     Neurology Exam:    General apperance: NAD.     Mental status: Alert, awake and oriented to time place and person.    Recent and Remote memory: Intact.    Attention span and Concentration: Normal.     Language and Speech: Intact- No dysarthria.    Fluency, Naming , Repitition and Comprehension:  Intact    Cranial Nerves:   CN II: Visual fields are full. Intact. Fundi - Normal, No papillederma, Pupils - JULIETA  CN III, IV and VI: Extraocular movements are intact. Normal saccades.   CN V: Facial sensation is intact.   CN VII: Muscles of facial expression reveal no asymmetry. Intact.   CN VIII: Hearing is intact. Whispered voice intact.   CN IX and X: Palate elevates symmetrically. Intact  CN XI: Shoulder shrug is intact.   CN XII: Tongue is midline without evidence of atrophy or fasciculation.     Ophthalmoscopic exam of optic disc-normal    Motor:  Right UE muscle strength 5/5. Normal tone.     Left UE muscle strength 5/5. Normal " tone.      Right LE muscle strength5/5. Normal tone.     Left LE muscle strength 5/5. Normal tone.      Sensory: Normal light touch, vibration and pinprick sensation bilaterally.    DTRs: 2+ bilaterally in upper and lower extremities.    Babinski: Negative bilaterally.    Co-ordination: Normal finger-to-nose, heel to shin B/L.    Rhomberg: Negative.    Gait: Normal.    Cardiovascular: Regular rate and rhythm without murmur, gallop or rub.    Assessment and Plan:  1. Visual aura  Based on the description of her symptoms I feel she most likely has a visual auras related to migraine and not a seizure.  I will however repeat a longer sleep deprived EEG as the first 1 showed slowing in the left temporal region  I have told her to take magnesium oxide supplements for her headaches  I will start her on Maxalt 5 mg to be taken at the onset of her aura to see if that helps    - EEG; Future       Return in about 6 weeks (around 12/31/2019).         Lor Lemon MD

## 2020-01-14 ENCOUNTER — HOSPITAL ENCOUNTER (OUTPATIENT)
Dept: NEUROLOGY | Facility: HOSPITAL | Age: 65
Discharge: HOME | End: 2020-01-14
Admitting: PSYCHIATRY & NEUROLOGY

## 2020-01-14 DIAGNOSIS — H53.9 VISUAL AURA: ICD-10-CM

## 2020-01-14 PROCEDURE — 95816 EEG AWAKE AND DROWSY: CPT | Performed by: PSYCHIATRY & NEUROLOGY

## 2020-01-14 PROCEDURE — 95713 VEEG 2-12 HR CONT MNTR: CPT

## 2020-01-15 ENCOUNTER — TELEPHONE (OUTPATIENT)
Dept: NEUROLOGY | Facility: CLINIC | Age: 65
End: 2020-01-15

## 2020-01-15 NOTE — TELEPHONE ENCOUNTER
----- Message from Lor Lemon MD sent at 1/14/2020  4:45 PM EST -----  Can you tell the patient that her EEG was normal.

## 2020-01-21 ENCOUNTER — OFFICE VISIT (OUTPATIENT)
Dept: NEUROLOGY | Facility: CLINIC | Age: 65
End: 2020-01-21

## 2020-01-21 VITALS
WEIGHT: 185 LBS | DIASTOLIC BLOOD PRESSURE: 90 MMHG | HEIGHT: 63 IN | HEART RATE: 77 BPM | SYSTOLIC BLOOD PRESSURE: 158 MMHG | OXYGEN SATURATION: 97 % | BODY MASS INDEX: 32.78 KG/M2

## 2020-01-21 DIAGNOSIS — H53.9 VISUAL AURA: Primary | ICD-10-CM

## 2020-01-21 PROCEDURE — 99213 OFFICE O/P EST LOW 20 MIN: CPT | Performed by: PSYCHIATRY & NEUROLOGY

## 2020-01-21 NOTE — PROGRESS NOTES
Subjective:    CC: Ramila Roldan is seen today  for visual auras    HPI:  Current visit- since the last visit patient's visual auras have improved since she started taking magnesium supplements.  She denies having any headache with these auras recently.  She did have an EEG that I personally reviewed that did not show any epileptiform activity.  Her blood pressure is high today but patient attributes that to whitecoat syndrome and also to the fact that she has been having white bread recently which increases her blood pressure.  At home it has been running less than 130/90.    Initial [id number] female with a history of colon cancer status post colon resection, right cerebellar meningioma status post resection in August, arthritis presents with visual auras.  As per patient she started having visual aura several years ago.  With these she sees a geometric pattern in her field of vision in both eyes that keeps getting bigger.  She also has blurred vision in her eyes prior to the start of her aura.  Initially she would not get a headache with her aura however recently has had a few episodes where she has had a dull headache either at the top or back of her head with occasional nausea.  With her last such episode that lasted for hours patient also had unsteady gait.  It took her several weeks to recover from that episode.  She denies any episodes of confusion or altered awareness with these auras.  Is currently getting them at least once a month.  She recently had her eye checkup and her vision was normal however she was given reading glasses that has helped with her headaches.  She had a 20-minute EEG recently as her neurosurgeon was concerned that these may be seizures which showed intermittent left temporal slowing but no epileptiform activity.  She also had an MRI brain in August after her meningioma resection surgery that showed an area of encephalomalacia in the right cerebellar region but no acute  "abnormalities.  Also no mesial temporal abnormalities were noted.  Patient denies having any seizures as a child or family history of seizures.  She did have a concussion to the left side of her head while playing basketball in college.  Did have mild memory problems after that.  Patient also does not like taking medications and is mainly into supplements.  She was started on \" blood thinners\" after she had an angiogram prior to her meningioma resection as she sustained an intimal tear in the right carotid at the time but stopped taking it after a few days as it was causing her to have leg pain.  Currently on no medications.    The following portions of the patient's history were reviewed today and updated as of 01/21/2020  : allergies, current medications, past family history, past medical history, past social history, past surgical history and problem list  These document will be scanned to patient's chart.      Current Outpatient Medications:   •  BLACK COHOSH ROOT PO, Take  by mouth., Disp: , Rfl:   •  CALCIUM LACTATE PO, Take  by mouth As Needed., Disp: , Rfl:   •  Capsicum, Cayenne, (CAYENNE PO), Take  by mouth., Disp: , Rfl:   •  COD LIVER OIL PO, Take 2 mL by mouth Daily., Disp: , Rfl:   •  FIBER PO, Take  by mouth., Disp: , Rfl:   •  JIANG SEAL PO, Take  by mouth., Disp: , Rfl:   •  magnesium citrate 1.745 GM/30ML solution solution, Take  by mouth 1 (One) Time., Disp: , Rfl:   •  Potassium 99 MG tablet, Take 99 mg by mouth As Needed., Disp: , Rfl:   •  rizatriptan (MAXALT) 5 MG tablet, Take at onset of aura.  May repeat in 2 hours if needed.  Do not take over 2 tabs a day or 8 tabs a month, Disp: 8 tablet, Rfl: 2  •  TURMERIC PO, Take  by mouth., Disp: , Rfl:    Past Medical History:   Diagnosis Date   • Anemia    • Anemia    • Arthritis    • Cancer (CMS/HCC)     colon   • H/O bladder infections    • History of transfusion    • Low back pain    • Murmur    • Transfusion history       Past Surgical History: " "  Procedure Laterality Date   • CEREBRAL ANGIOGRAM      06/25/2019 PER DR. PETERS.   • CHOLECYSTECTOMY     • COLON RESECTION     • COLONOSCOPY  2019   • CRANIOTOMY Right 8/14/2019    Procedure: CRANIOTOMY SUBOCCIPITAL WITH STEALTH RIGHT;  Surgeon: Angel Birch MD;  Location: [facility] OR;  Service: Neurosurgery   • ENDOSCOPY     • INTERVENTIONAL RADIOLOGY PROCEDURE Bilateral 6/25/2019    Procedure: Carotid Cerebral Angiogram;  Surgeon: Yanick Peters MD;  Location: [facility] CATH INVASIVE LOCATION;  Service: Interventional Radiology      Family History   Problem Relation Age of Onset   • Heart disease Mother    • Heart disease Father    • Cancer Sister       Social History     Socioeconomic History   • Marital status:      Spouse name: Not on file   • Number of children: Not on file   • Years of education: Not on file   • Highest education level: Not on file   Tobacco Use   • Smoking status: Never Smoker   • Smokeless tobacco: Never Used   Substance and Sexual Activity   • Alcohol use: Yes     Frequency: Never   • Drug use: No   • Sexual activity: Defer     Review of Systems   All other systems reviewed and are negative.      Objective:    /90   Pulse 77   Ht 160 cm (63\")   Wt 83.9 kg (185 lb)   SpO2 97%   BMI 32.77 kg/m²     Neurology Exam:    General apperance: NAD.     Mental status: Alert, awake and oriented to time place and person.    Recent and Remote memory: Intact.    Attention span and Concentration: Normal.     Language and Speech: Intact- No dysarthria.    Fluency, Naming , Repitition and Comprehension:  Intact    Cranial Nerves:   CN II: Visual fields are full. Intact. Fundi - Normal, No papillederma, Pupils - JULIETA  CN III, IV and VI: Extraocular movements are intact. Normal saccades.   CN V: Facial sensation is intact.   CN VII: Muscles of facial expression reveal no asymmetry. Intact.   CN VIII: Hearing is intact. Whispered voice intact.   CN IX and X: Palate elevates " symmetrically. Intact  CN XI: Shoulder shrug is intact.   CN XII: Tongue is midline without evidence of atrophy or fasciculation.     Ophthalmoscopic exam of optic disc-normal    Motor:  Right UE muscle strength 5/5. Normal tone.     Left UE muscle strength 5/5. Normal tone.      Right LE muscle strength5/5. Normal tone.     Left LE muscle strength 5/5. Normal tone.      Sensory: Normal light touch, vibration and pinprick sensation bilaterally.    DTRs: 2+ bilaterally in upper and lower extremities.    Babinski: Negative bilaterally.    Co-ordination: Normal finger-to-nose, heel to shin B/L.    Rhomberg: Negative.    Gait: Normal.    Cardiovascular: Regular rate and rhythm without murmur, gallop or rub.    Assessment and Plan:  1. Visual aura  Most likely related to ocular migraines.  2-hour EEG was normal  I have asked her to continue magnesium supplements  If they do worsen in the future then I may start her on Inderal which has shown to help  Also if she has a severe headache she can take Maxalt (prescription sent)       Return in about 6 months (around 7/21/2020).         Lor Lemon MD

## 2020-03-31 ENCOUNTER — APPOINTMENT (OUTPATIENT)
Dept: MRI IMAGING | Facility: HOSPITAL | Age: 65
End: 2020-03-31

## 2020-05-11 ENCOUNTER — TELEPHONE (OUTPATIENT)
Dept: NEUROSURGERY | Facility: CLINIC | Age: 65
End: 2020-05-11

## 2020-05-11 DIAGNOSIS — D32.9 MENINGIOMA (HCC): Primary | ICD-10-CM

## 2020-05-21 ENCOUNTER — OFFICE VISIT (OUTPATIENT)
Dept: NEUROSURGERY | Facility: CLINIC | Age: 65
End: 2020-05-21

## 2020-05-21 ENCOUNTER — HOSPITAL ENCOUNTER (OUTPATIENT)
Dept: MRI IMAGING | Facility: HOSPITAL | Age: 65
Discharge: HOME | End: 2020-05-21
Admitting: NEUROLOGICAL SURGERY

## 2020-05-21 VITALS
DIASTOLIC BLOOD PRESSURE: 84 MMHG | HEIGHT: 63 IN | WEIGHT: 162 LBS | TEMPERATURE: 98.6 F | BODY MASS INDEX: 28.7 KG/M2 | SYSTOLIC BLOOD PRESSURE: 146 MMHG

## 2020-05-21 DIAGNOSIS — D32.9 MENINGIOMA (HCC): ICD-10-CM

## 2020-05-21 DIAGNOSIS — D49.6 BRAIN TUMOR (HCC): Primary | ICD-10-CM

## 2020-05-21 PROCEDURE — 0 GADOBENATE DIMEGLUMINE 529 MG/ML SOLUTION: Performed by: NEUROLOGICAL SURGERY

## 2020-05-21 PROCEDURE — 99213 OFFICE O/P EST LOW 20 MIN: CPT | Performed by: NEUROLOGICAL SURGERY

## 2020-05-21 PROCEDURE — 70553 MRI BRAIN STEM W/O & W/DYE: CPT

## 2020-05-21 PROCEDURE — 82565 ASSAY OF CREATININE: CPT

## 2020-05-21 PROCEDURE — A9577 INJ MULTIHANCE: HCPCS | Performed by: NEUROLOGICAL SURGERY

## 2020-05-21 RX ADMIN — GADOBENATE DIMEGLUMINE 15 ML: 529 INJECTION, SOLUTION INTRAVENOUS at 09:02

## 2020-05-21 NOTE — PROGRESS NOTES
Ramila Roldan  1955  [id number]                        CHIEF COMPLAINT: Status post resection of right posterior fossa meningioma         MEDICAL HISTORY SINCE LAST ENCOUNTER: 65-year-old here for follow-up with sequential MRI for evaluation meningioma removed from the right posterior fossa on 8/14/2019.  He is doing well and has no specific symptoms.           Past Medical History:   Diagnosis Date   • Anemia    • Anemia    • Arthritis    • Cancer (CMS/HCC)     colon   • H/O bladder infections    • History of transfusion    • Low back pain    • Murmur    • Transfusion history               Past Surgical History:   Procedure Laterality Date   • CEREBRAL ANGIOGRAM      06/25/2019 PER DR. PETERS.   • CHOLECYSTECTOMY     • COLON RESECTION     • COLONOSCOPY  2019   • CRANIOTOMY Right 8/14/2019    Procedure: CRANIOTOMY SUBOCCIPITAL WITH STEALTH RIGHT;  Surgeon: Angel Birch MD;  Location:  [facility];  Service: Neurosurgery   • ENDOSCOPY     • INTERVENTIONAL RADIOLOGY PROCEDURE Bilateral 6/25/2019    Procedure: Carotid Cerebral Angiogram;  Surgeon: Yanick Peters MD;  Location:  [facility] CATH INVASIVE LOCATION;  Service: Interventional Radiology              Family History   Problem Relation Age of Onset   • Heart disease Mother    • Heart disease Father    • Cancer Sister               Social History     Socioeconomic History   • Marital status:      Spouse name: Not on file   • Number of children: Not on file   • Years of education: Not on file   • Highest education level: Not on file   Tobacco Use   • Smoking status: Never Smoker   • Smokeless tobacco: Never Used   Substance and Sexual Activity   • Alcohol use: Yes     Frequency: Never   • Drug use: No   • Sexual activity: Defer              Allergies   Allergen Reactions   • Brilinta [Ticagrelor] Other (See Comments)     Chest pain      • Plavix [Clopidogrel] Other (See Comments)     Chest pain plus other symptoms               Current Outpatient  Medications:   •  BLACK COHOSH ROOT PO, Take  by mouth., Disp: , Rfl:   •  CALCIUM LACTATE PO, Take  by mouth As Needed., Disp: , Rfl:   •  Capsicum, Cayenne, (CAYENNE PO), Take  by mouth., Disp: , Rfl:   •  COD LIVER OIL PO, Take 2 mL by mouth Daily., Disp: , Rfl:   •  FIBER PO, Take  by mouth., Disp: , Rfl:   •  JIANG SEAL PO, Take  by mouth., Disp: , Rfl:   •  magnesium citrate 1.745 GM/30ML solution solution, Take  by mouth 1 (One) Time., Disp: , Rfl:   •  Potassium 99 MG tablet, Take 99 mg by mouth As Needed., Disp: , Rfl:   •  rizatriptan (MAXALT) 5 MG tablet, Take at onset of aura.  May repeat in 2 hours if needed.  Do not take over 2 tabs a day or 8 tabs a month, Disp: 8 tablet, Rfl: 2  •  TURMERIC PO, Take  by mouth., Disp: , Rfl:   No current facility-administered medications for this visit.          Review of Systems   Constitutional: Positive for fatigue. Negative for activity change, appetite change, chills, diaphoresis, fever and unexpected weight change.   HENT: Positive for ear pain and tinnitus. Negative for congestion, dental problem, drooling, ear discharge, facial swelling, hearing loss, mouth sores, nosebleeds, postnasal drip, rhinorrhea, sinus pressure, sneezing, sore throat, trouble swallowing and voice change.    Eyes: Positive for photophobia and visual disturbance. Negative for pain, discharge, redness and itching.   Respiratory: Negative for apnea, cough, choking, chest tightness, shortness of breath, wheezing and stridor.    Cardiovascular: Positive for chest pain. Negative for palpitations and leg swelling.   Gastrointestinal: Negative.  Negative for abdominal distention, abdominal pain, anal bleeding, blood in stool, constipation, diarrhea, nausea, rectal pain and vomiting.   Endocrine: Negative.  Negative for cold intolerance, heat intolerance, polydipsia, polyphagia and polyuria.   Genitourinary: Negative.  Negative for decreased urine volume, difficulty urinating, dysuria, enuresis,  "flank pain, frequency, genital sores, hematuria and urgency.   Musculoskeletal: Positive for myalgias and neck stiffness. Negative for arthralgias, back pain, gait problem, joint swelling and neck pain.   Skin: Negative.  Negative for color change, pallor, rash and wound.   Allergic/Immunologic: Negative.  Negative for environmental allergies, food allergies and immunocompromised state.   Neurological: Positive for headaches. Negative for dizziness, tremors, seizures, syncope, facial asymmetry, speech difficulty, weakness, light-headedness and numbness.   Hematological: Negative.  Negative for adenopathy. Does not bruise/bleed easily.   Psychiatric/Behavioral: Negative.  Negative for agitation, behavioral problems, confusion, decreased concentration, dysphoric mood, hallucinations, self-injury, sleep disturbance and suicidal ideas. The patient is not nervous/anxious and is not hyperactive.                Vitals:    05/21/20 0933   Height: 160 cm (63\")               EXAMINATION: Alert and oriented.  No ataxia.  No weakness.  Gait normal            MEDICAL DECISION MAKING: Repeat MRI shows no evidence of recurrence or progression           ASSESSMENT/DISPOSITION: She return for follow-up in 6 months.  I would suggest following this by annually for the first 1-2 years and then yearly thereafter.              I APPRECIATE THE OPPORTUNITY OF THIS REFERRAL. PLEASE CALL IF ANY       QUESTIONS [phone]        I have received verbal consent from the patient to receive care via telehealth.   "

## 2020-05-22 LAB — CREAT BLDA-MCNC: 0.6 MG/DL (ref 0.6–1.3)

## 2020-11-17 ENCOUNTER — TELEPHONE (OUTPATIENT)
Dept: NEUROSURGERY | Facility: CLINIC | Age: 65
End: 2020-11-17

## 2020-11-17 NOTE — TELEPHONE ENCOUNTER
Patient has moved to [place] and has cancelled her MRI and follow up. She has established a PCP and has a follow up appointment on 12/3/20. I told her she should ask them for a a referral to neurosurgery so she can have MRI. I will call her after 12/3/20 to check with her on this.

## 2020-11-23 ENCOUNTER — APPOINTMENT (OUTPATIENT)
Dept: MRI IMAGING | Facility: HOSPITAL | Age: 65
End: 2020-11-23

## 2021-01-20 ENCOUNTER — TELEPHONE (OUTPATIENT)
Dept: NEUROSURGERY | Facility: CLINIC | Age: 66
End: 2021-01-20

## 2021-02-10 ENCOUNTER — TELEPHONE (OUTPATIENT)
Dept: NEUROSURGERY | Facility: CLINIC | Age: 66
End: 2021-02-10

## 2021-02-10 DIAGNOSIS — D32.9 MENINGIOMA (HCC): Primary | ICD-10-CM

## 2021-02-10 NOTE — TELEPHONE ENCOUNTER
[place] but was told by Dr. Birch to send her MRIs here and he will review.     We have received a report for MRI brain without contrast only.  Dr. Birch wants pt to have scan with contrast.  I have ordered MRI brain with contrast.      Please contact pt and schedule MRI brain with (or ask pt to have this done and send to Dr. Birch).  Pt may need to have PCP order for [place] state.  Please let me know how I can help.

## 2021-06-17 DIAGNOSIS — D49.6 BRAIN TUMOR (HCC): Primary | ICD-10-CM

## 2023-05-16 NOTE — PROGRESS NOTES
Ramila Roldan  1955  [id number]      Chief Complaint   Patient presents with   • Headache       HISTORY OF PRESENT ILLNESS: This is a 64-year-old female who is referred with a chief complaint of headache.  In February 2019 she was diagnosed and underwent surgical intervention for colon cancer.  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  This appears to be dural based minimal edema.  There is no hydrocephalus or shift of the fourth ventricle.    Her headache is somewhat ill-defined and not that usually associated with increased intracranial pressure.  She attributes this to a high school athletic injury in which she was involved.  Recent symptoms of injury, however.  She has no symptoms of dysmetria or ataxia.  She has no other issues of neurological dysfunction although she has noted that she has issues with her memory more recently subsequent to the stress under which she is living.    Past Medical History:   Diagnosis Date   • Anemia    • Anemia    • Arthritis    • Bronchitis    • Cancer (CMS/HCC)     colon   • H/O bladder infections    • History of transfusion    • Low back pain    • Maternal toxemia, unspecified trimester    • Pneumonia    • Transfusion history        Past Surgical History:   Procedure Laterality Date   • CHOLECYSTECTOMY     • COLON RESECTION         Family History   Problem Relation Age of Onset   • Heart disease Mother    • Heart disease Father    • Cancer Sister        Social History     Socioeconomic History   • Marital status:      Spouse name: Not on file   • Number of children: Not on file   • Years of education: Not on file   • Highest education level: Not on file   Tobacco Use   • Smoking status: Never Smoker   • Smokeless tobacco: Never Used   Substance and Sexual Activity   • Alcohol use: Yes   • Drug use: No   • Sexual activity: Defer       No  Known Allergies    No current outpatient medications on file.    Review of Systems   Constitutional: Positive for activity change and fatigue. Negative for appetite change, chills, diaphoresis, fever and unexpected weight change.   HENT: Positive for congestion, ear pain and tinnitus. Negative for dental problem, drooling, ear discharge, facial swelling, hearing loss, mouth sores, nosebleeds, postnasal drip, rhinorrhea, sinus pressure, sneezing, sore throat, trouble swallowing and voice change.    Eyes: Positive for pain and visual disturbance. Negative for photophobia, discharge, redness and itching.   Respiratory: Negative for apnea, cough, choking, chest tightness, shortness of breath, wheezing and stridor.    Cardiovascular: Negative for chest pain, palpitations and leg swelling.   Gastrointestinal: Positive for constipation. Negative for abdominal distention, abdominal pain, anal bleeding, blood in stool, diarrhea, nausea, rectal pain and vomiting.   Endocrine: Negative for cold intolerance, heat intolerance, polydipsia, polyphagia and polyuria.   Genitourinary: Negative for decreased urine volume, difficulty urinating, dysuria, enuresis, flank pain, frequency, genital sores, hematuria and urgency.   Musculoskeletal: Positive for arthralgias, back pain and neck pain. Negative for gait problem, joint swelling, myalgias and neck stiffness.   Skin: Negative for color change, pallor, rash and wound.   Allergic/Immunologic: Positive for environmental allergies. Negative for food allergies and immunocompromised state.   Neurological: Positive for headaches. Negative for dizziness, tremors, seizures, syncope, facial asymmetry, speech difficulty, weakness, light-headedness and numbness.   Hematological: Negative for adenopathy. Does not bruise/bleed easily.   Psychiatric/Behavioral: Negative for agitation, behavioral problems, confusion, decreased concentration, dysphoric mood, hallucinations, self-injury, sleep  "disturbance and suicidal ideas. The patient is not nervous/anxious and is not hyperactive.        Vitals:    06/18/19 0850   BP: (!) 162/102   BP Location: Left arm   Patient Position: Sitting   Cuff Size: Adult   Temp: 98.7 °F (37.1 °C)   TempSrc: Temporal   Weight: 78.9 kg (174 lb)   Height: 160 cm (63\")       Neurological Examination:    Mental status/speech: The patient is alert and oriented.  Speech is clear without aphysia or dysarthria.  No overt cognitive deficits.    Cranial nerve examination:    Olfaction: Smell is intact.  Vision: Vision is intact without visual field abnormalities.  Funduscopic examination is normal.  No pupillary irregularity.  Ocular motor examination: The extraocular muscles are intact.  There is no diplopia.  The pupil is round and reactive to both light and accommodation.  There is no nystagmus.  Facial movement/sensation: There is no facial weakness.  Sensation is intact in the first, second, and third divisions of the trigeminal nerve.  The corneal reflex is intact.  Auditory: Hearing is intact to finger rub bilaterally.  Cranial nerves IX, X, XI, XII: Phonation is normal.  No dysphagia.  Tongue is protruded in the midline without atrophy.  The gag reflex is intact.  Shoulder shrug is normal.    Musculoligamentous ligamentous examination: No weakness, sensory loss or reflex asymmetry.  No ataxia or dysmetria.  Gait normal.  No Babinski Macario or clonus    Medical Decision Making:     Diagnostic Data Set: The MRI shows a dural based tumor in the posterior fossa in the mid right cerebellar hemisphere.  This appears to be adjacent to if not attached to the lateral sinus.      Assessment: Cerebellar neoplasm          Recommendations: It is impossible to determine whether this is a benign lesion such as a meningioma or indeed a metastatic disease.  In any case I have recommended removal for histopathological confirmation as well as extraction.  I reviewed all the risks and benefits.  " We will do cerebral angiography prior to her surgical intervention for cessation as well as determination of the patency of the lateral sinus.  Craniotomy will be Stealth guided as well.        I greatly appreciate the opportunity to see and evaluate this individual.  If you have questions or concerns regarding issues that I may have overlooked please call me at any time: [phone].  Edson Birch M.D.  [facility]  [address] [place] [address]    Scribed for Angel Birch MD by John Pierce CMA. 6/18/2019 9:59 AM     I have read and concur with the information provided by the scribe.  Angel Birch MD     No

## (undated) DEVICE — SHEATH FLXOR SHUTTLESELECT .038 6F2.2 90

## (undated) DEVICE — GLV SURG PREMIERPRO MIC LTX PF SZ7.5 BRN

## (undated) DEVICE — STPCK 3/WY HP M/RA W/OFF/HNDL 1050PSI STRL

## (undated) DEVICE — INTRO SHEATH ART/FEM ENGAGE .038 5F12CM

## (undated) DEVICE — PK CRANI 10

## (undated) DEVICE — DRSNG TELFA PAD NONADH STR 1S 3X8IN

## (undated) DEVICE — DISPOSABLE TUBING SET AND EXTENDER FILTER TUBING

## (undated) DEVICE — SUT VIC PLS CTD ANTIB BR 3/0 8/18IN 45CM

## (undated) DEVICE — ANTIBACTERIAL UNDYED BRAIDED (POLYGLACTIN 910), SYNTHETIC ABSORBABLE SUTURE: Brand: COATED VICRYL

## (undated) DEVICE — TOOL 9AC60 LEGEND 9CM 6MM AC: Brand: MIDAS REX

## (undated) DEVICE — GLV SURG PREMIERPRO MIC LTX PF SZ7 BRN

## (undated) DEVICE — DRV BATRY MATRIXPRO STRL

## (undated) DEVICE — STPCK LP 1WY RA 200PSI

## (undated) DEVICE — ST ACC MICROPUNCTURE .018 TRANSLSS/SS/TP 5F/10CM 21G/7CM

## (undated) DEVICE — DISPOSABLE SUCTION CANISTER LINERS

## (undated) DEVICE — NEURO GUIDEWIRE WITH HYDROPHILIC COATING: Brand: SYNCHRO 14

## (undated) DEVICE — LIMB HOLDER, WRIST/ANKLE: Brand: DEROYAL

## (undated) DEVICE — TOOL 14MH30 LEGEND 14CM 3MM: Brand: MIDAS REX ™

## (undated) DEVICE — Device

## (undated) DEVICE — MAYFIELD® DISPOSABLE ADULT SKULL PIN (PLASTIC BASE): Brand: MAYFIELD®

## (undated) DEVICE — CATH TEMPO 5F BER 100CM: Brand: TEMPO

## (undated) DEVICE — ROTATING HEMOSTATIC VALVE .096": Brand: RHV

## (undated) DEVICE — 3M™ MEDIPORE™ H SOFT CLOTH SURGICAL TAPE 2862, 2 INCH X 10 YARD (5CM X 9,1M), 12 ROLLS/CASE: Brand: 3M™ MEDIPORE™

## (undated) DEVICE — STRAIGHT TIP, UNIVERSAL

## (undated) DEVICE — SPNG GZ STRL 2S 4X4 12PLY

## (undated) DEVICE — ACCY PA700 LUBRICANT DIFFUSER MR7 4 PACK: Brand: MIDAS REX

## (undated) DEVICE — GLV SURG PREMIERPRO MIC LTX PF SZ6.5 BRN

## (undated) DEVICE — ANGIO-SEAL VIP VASCULAR CLOSURE DEVICE: Brand: ANGIO-SEAL

## (undated) DEVICE — PK CATH CARD 10

## (undated) DEVICE — GW TRANSEND300 .014 FLOP 2X300CM

## (undated) DEVICE — CATH MIC RENEGADE 2MARK 3F 10X150CM

## (undated) DEVICE — SPNG GZ WOVN 4X4IN 12PLY 10/BX STRL

## (undated) DEVICE — RADIFOCUS GLIDEWIRE: Brand: GLIDEWIRE

## (undated) DEVICE — 2 TIP PRE-SHAPED 45 MICROCATHETER: Brand: EXCELSIOR SL-10